# Patient Record
Sex: MALE | Race: WHITE | HISPANIC OR LATINO | Employment: FULL TIME | URBAN - METROPOLITAN AREA
[De-identification: names, ages, dates, MRNs, and addresses within clinical notes are randomized per-mention and may not be internally consistent; named-entity substitution may affect disease eponyms.]

---

## 2017-01-04 ENCOUNTER — GENERIC CONVERSION - ENCOUNTER (OUTPATIENT)
Dept: OTHER | Facility: OTHER | Age: 57
End: 2017-01-04

## 2017-02-14 ENCOUNTER — ALLSCRIPTS OFFICE VISIT (OUTPATIENT)
Dept: OTHER | Facility: OTHER | Age: 57
End: 2017-02-14

## 2017-02-14 ENCOUNTER — HOSPITAL ENCOUNTER (OUTPATIENT)
Dept: RADIOLOGY | Facility: HOSPITAL | Age: 57
Discharge: HOME | End: 2017-02-14
Attending: ORTHOPAEDIC SURGERY
Payer: COMMERCIAL

## 2017-02-14 DIAGNOSIS — M25.512 PAIN IN LEFT SHOULDER: ICD-10-CM

## 2017-02-14 DIAGNOSIS — M75.42 IMPINGEMENT SYNDROME OF LEFT SHOULDER: ICD-10-CM

## 2017-02-14 PROCEDURE — 73030 X-RAY EXAM OF SHOULDER: CPT

## 2017-02-28 ENCOUNTER — GENERIC CONVERSION - ENCOUNTER (OUTPATIENT)
Dept: OTHER | Facility: OTHER | Age: 57
End: 2017-02-28

## 2017-02-28 ENCOUNTER — APPOINTMENT (OUTPATIENT)
Dept: PHYSICAL THERAPY | Facility: CLINIC | Age: 57
End: 2017-02-28
Payer: COMMERCIAL

## 2017-02-28 DIAGNOSIS — M75.42 IMPINGEMENT SYNDROME OF LEFT SHOULDER: ICD-10-CM

## 2017-02-28 PROCEDURE — 97161 PT EVAL LOW COMPLEX 20 MIN: CPT

## 2017-02-28 PROCEDURE — 97110 THERAPEUTIC EXERCISES: CPT

## 2017-03-02 ENCOUNTER — APPOINTMENT (OUTPATIENT)
Dept: PHYSICAL THERAPY | Facility: CLINIC | Age: 57
End: 2017-03-02
Payer: COMMERCIAL

## 2017-03-02 PROCEDURE — 97140 MANUAL THERAPY 1/> REGIONS: CPT

## 2017-03-02 PROCEDURE — 97110 THERAPEUTIC EXERCISES: CPT

## 2017-03-06 ENCOUNTER — GENERIC CONVERSION - ENCOUNTER (OUTPATIENT)
Dept: OTHER | Facility: OTHER | Age: 57
End: 2017-03-06

## 2017-03-07 ENCOUNTER — APPOINTMENT (OUTPATIENT)
Dept: PHYSICAL THERAPY | Facility: CLINIC | Age: 57
End: 2017-03-07
Payer: COMMERCIAL

## 2017-03-07 PROCEDURE — 97140 MANUAL THERAPY 1/> REGIONS: CPT

## 2017-03-07 PROCEDURE — 97110 THERAPEUTIC EXERCISES: CPT

## 2017-03-09 ENCOUNTER — APPOINTMENT (OUTPATIENT)
Dept: PHYSICAL THERAPY | Facility: CLINIC | Age: 57
End: 2017-03-09
Payer: COMMERCIAL

## 2017-03-09 PROCEDURE — 97140 MANUAL THERAPY 1/> REGIONS: CPT

## 2017-03-09 PROCEDURE — 97110 THERAPEUTIC EXERCISES: CPT

## 2017-03-16 ENCOUNTER — APPOINTMENT (OUTPATIENT)
Dept: PHYSICAL THERAPY | Facility: CLINIC | Age: 57
End: 2017-03-16
Payer: COMMERCIAL

## 2017-03-16 PROCEDURE — 97140 MANUAL THERAPY 1/> REGIONS: CPT

## 2017-03-16 PROCEDURE — 97110 THERAPEUTIC EXERCISES: CPT

## 2017-03-20 ENCOUNTER — ALLSCRIPTS OFFICE VISIT (OUTPATIENT)
Dept: OTHER | Facility: OTHER | Age: 57
End: 2017-03-20

## 2017-03-21 ENCOUNTER — APPOINTMENT (OUTPATIENT)
Dept: PHYSICAL THERAPY | Facility: CLINIC | Age: 57
End: 2017-03-21
Payer: COMMERCIAL

## 2017-03-21 PROCEDURE — 97110 THERAPEUTIC EXERCISES: CPT

## 2017-03-21 PROCEDURE — 97140 MANUAL THERAPY 1/> REGIONS: CPT

## 2017-03-23 ENCOUNTER — APPOINTMENT (OUTPATIENT)
Dept: PHYSICAL THERAPY | Facility: CLINIC | Age: 57
End: 2017-03-23
Payer: COMMERCIAL

## 2017-03-28 ENCOUNTER — GENERIC CONVERSION - ENCOUNTER (OUTPATIENT)
Dept: OTHER | Facility: OTHER | Age: 57
End: 2017-03-28

## 2017-03-28 ENCOUNTER — APPOINTMENT (OUTPATIENT)
Dept: PHYSICAL THERAPY | Facility: CLINIC | Age: 57
End: 2017-03-28
Payer: COMMERCIAL

## 2017-03-28 PROCEDURE — 97110 THERAPEUTIC EXERCISES: CPT

## 2017-03-28 PROCEDURE — 97140 MANUAL THERAPY 1/> REGIONS: CPT

## 2017-03-30 ENCOUNTER — APPOINTMENT (OUTPATIENT)
Dept: PHYSICAL THERAPY | Facility: CLINIC | Age: 57
End: 2017-03-30
Payer: COMMERCIAL

## 2017-03-30 PROCEDURE — 97140 MANUAL THERAPY 1/> REGIONS: CPT

## 2017-03-30 PROCEDURE — 97110 THERAPEUTIC EXERCISES: CPT

## 2017-04-04 ENCOUNTER — ALLSCRIPTS OFFICE VISIT (OUTPATIENT)
Dept: OTHER | Facility: OTHER | Age: 57
End: 2017-04-04

## 2017-04-04 ENCOUNTER — TRANSCRIBE ORDERS (OUTPATIENT)
Dept: ADMINISTRATIVE | Facility: HOSPITAL | Age: 57
End: 2017-04-04

## 2017-04-04 DIAGNOSIS — M75.42 IMPINGEMENT SYNDROME OF LEFT SHOULDER: Primary | ICD-10-CM

## 2017-04-06 ENCOUNTER — GENERIC CONVERSION - ENCOUNTER (OUTPATIENT)
Dept: OTHER | Facility: OTHER | Age: 57
End: 2017-04-06

## 2017-04-10 ENCOUNTER — HOSPITAL ENCOUNTER (OUTPATIENT)
Dept: RADIOLOGY | Age: 57
Discharge: HOME | End: 2017-04-10
Payer: COMMERCIAL

## 2017-04-10 DIAGNOSIS — M75.42 IMPINGEMENT SYNDROME OF LEFT SHOULDER: ICD-10-CM

## 2017-04-10 PROCEDURE — 73221 MRI JOINT UPR EXTREM W/O DYE: CPT

## 2017-04-25 ENCOUNTER — ALLSCRIPTS OFFICE VISIT (OUTPATIENT)
Dept: OTHER | Facility: OTHER | Age: 57
End: 2017-04-25

## 2017-04-25 ENCOUNTER — APPOINTMENT (OUTPATIENT)
Dept: LAB | Facility: HOSPITAL | Age: 57
End: 2017-04-25
Attending: ORTHOPAEDIC SURGERY
Payer: COMMERCIAL

## 2017-04-25 DIAGNOSIS — M75.42 IMPINGEMENT SYNDROME OF LEFT SHOULDER: ICD-10-CM

## 2017-04-25 LAB
BASOPHILS # BLD AUTO: 0.05 THOUSANDS/ΜL (ref 0–0.1)
BASOPHILS NFR BLD AUTO: 1 % (ref 0–1)
EOSINOPHIL # BLD AUTO: 0.39 THOUSAND/ΜL (ref 0–0.61)
EOSINOPHIL NFR BLD AUTO: 5 % (ref 0–6)
ERYTHROCYTE [DISTWIDTH] IN BLOOD BY AUTOMATED COUNT: 12.8 % (ref 11.6–15.1)
HCT VFR BLD AUTO: 44.1 % (ref 36.5–49.3)
HGB BLD-MCNC: 14.6 G/DL (ref 12–17)
LYMPHOCYTES # BLD AUTO: 3.25 THOUSANDS/ΜL (ref 0.6–4.47)
LYMPHOCYTES NFR BLD AUTO: 38 % (ref 14–44)
MCH RBC QN AUTO: 29 PG (ref 26.8–34.3)
MCHC RBC AUTO-ENTMCNC: 33.1 G/DL (ref 31.4–37.4)
MCV RBC AUTO: 88 FL (ref 82–98)
MONOCYTES # BLD AUTO: 0.78 THOUSAND/ΜL (ref 0.17–1.22)
MONOCYTES NFR BLD AUTO: 9 % (ref 4–12)
NEUTROPHILS # BLD AUTO: 3.97 THOUSANDS/ΜL (ref 1.85–7.62)
NEUTS SEG NFR BLD AUTO: 47 % (ref 43–75)
NRBC BLD AUTO-RTO: 0 /100 WBCS
PLATELET # BLD AUTO: 293 THOUSANDS/UL (ref 149–390)
PMV BLD AUTO: 9.7 FL (ref 8.9–12.7)
RBC # BLD AUTO: 5.04 MILLION/UL (ref 3.88–5.62)
WBC # BLD AUTO: 8.47 THOUSAND/UL (ref 4.31–10.16)

## 2017-04-25 PROCEDURE — 36415 COLL VENOUS BLD VENIPUNCTURE: CPT

## 2017-04-25 PROCEDURE — 85025 COMPLETE CBC W/AUTO DIFF WBC: CPT

## 2017-05-04 RX ORDER — TADALAFIL 5 MG/1
5 TABLET ORAL DAILY PRN
COMMUNITY

## 2017-05-04 RX ORDER — ACYCLOVIR 400 MG/1
400 TABLET ORAL
COMMUNITY
End: 2018-03-16 | Stop reason: SDUPTHER

## 2017-05-04 RX ORDER — KETOCONAZOLE 200 MG/1
200 TABLET ORAL DAILY
COMMUNITY
End: 2018-12-24

## 2017-05-09 ENCOUNTER — ANESTHESIA (OUTPATIENT)
Dept: PERIOP | Facility: HOSPITAL | Age: 57
End: 2017-05-09
Payer: COMMERCIAL

## 2017-05-09 ENCOUNTER — ANESTHESIA EVENT (OUTPATIENT)
Dept: PERIOP | Facility: HOSPITAL | Age: 57
End: 2017-05-09
Payer: COMMERCIAL

## 2017-05-09 ENCOUNTER — HOSPITAL ENCOUNTER (OUTPATIENT)
Facility: HOSPITAL | Age: 57
Setting detail: OUTPATIENT SURGERY
Discharge: HOME | End: 2017-05-09
Attending: ORTHOPAEDIC SURGERY | Admitting: ORTHOPAEDIC SURGERY
Payer: COMMERCIAL

## 2017-05-09 VITALS
SYSTOLIC BLOOD PRESSURE: 160 MMHG | WEIGHT: 185 LBS | HEIGHT: 66 IN | HEART RATE: 75 BPM | OXYGEN SATURATION: 98 % | BODY MASS INDEX: 29.73 KG/M2 | TEMPERATURE: 99.1 F | DIASTOLIC BLOOD PRESSURE: 99 MMHG | RESPIRATION RATE: 16 BRPM

## 2017-05-09 VITALS — OXYGEN SATURATION: 98 % | SYSTOLIC BLOOD PRESSURE: 148 MMHG | HEART RATE: 70 BPM | DIASTOLIC BLOOD PRESSURE: 80 MMHG

## 2017-05-09 PROBLEM — M75.42 IMPINGEMENT SYNDROME OF LEFT SHOULDER: Status: ACTIVE | Noted: 2017-05-09

## 2017-05-09 PROBLEM — S43.432A SUPERIOR GLENOID LABRUM LESION OF LEFT SHOULDER: Status: ACTIVE | Noted: 2017-05-09

## 2017-05-09 PROCEDURE — C1713 ANCHOR/SCREW BN/BN,TIS/BN: HCPCS | Performed by: ORTHOPAEDIC SURGERY

## 2017-05-09 DEVICE — DEPUY MITEK HEALIX ADVANCE 4.5 BR: Type: IMPLANTABLE DEVICE | Site: SHOULDER | Status: FUNCTIONAL

## 2017-05-09 RX ORDER — ONDANSETRON 2 MG/ML
4 INJECTION INTRAMUSCULAR; INTRAVENOUS ONCE AS NEEDED
Status: COMPLETED | OUTPATIENT
Start: 2017-05-09 | End: 2017-05-09

## 2017-05-09 RX ORDER — FENTANYL CITRATE 50 UG/ML
INJECTION, SOLUTION INTRAMUSCULAR; INTRAVENOUS
Status: COMPLETED
Start: 2017-05-09 | End: 2017-05-09

## 2017-05-09 RX ORDER — ONDANSETRON 2 MG/ML
4 INJECTION INTRAMUSCULAR; INTRAVENOUS EVERY 6 HOURS PRN
Status: DISCONTINUED | OUTPATIENT
Start: 2017-05-09 | End: 2017-05-09 | Stop reason: HOSPADM

## 2017-05-09 RX ORDER — ONDANSETRON 2 MG/ML
INJECTION INTRAMUSCULAR; INTRAVENOUS AS NEEDED
Status: DISCONTINUED | OUTPATIENT
Start: 2017-05-09 | End: 2017-05-09 | Stop reason: SURG

## 2017-05-09 RX ORDER — PROPOFOL 10 MG/ML
INJECTION, EMULSION INTRAVENOUS AS NEEDED
Status: DISCONTINUED | OUTPATIENT
Start: 2017-05-09 | End: 2017-05-09 | Stop reason: SURG

## 2017-05-09 RX ORDER — METOCLOPRAMIDE HYDROCHLORIDE 5 MG/ML
10 INJECTION INTRAMUSCULAR; INTRAVENOUS ONCE AS NEEDED
Status: COMPLETED | OUTPATIENT
Start: 2017-05-09 | End: 2017-05-09

## 2017-05-09 RX ORDER — LIDOCAINE HYDROCHLORIDE 10 MG/ML
INJECTION, SOLUTION INFILTRATION; PERINEURAL AS NEEDED
Status: DISCONTINUED | OUTPATIENT
Start: 2017-05-09 | End: 2017-05-09 | Stop reason: SURG

## 2017-05-09 RX ORDER — SODIUM CHLORIDE, SODIUM LACTATE, POTASSIUM CHLORIDE, CALCIUM CHLORIDE 600; 310; 30; 20 MG/100ML; MG/100ML; MG/100ML; MG/100ML
20 INJECTION, SOLUTION INTRAVENOUS CONTINUOUS
Status: DISCONTINUED | OUTPATIENT
Start: 2017-05-09 | End: 2017-05-09

## 2017-05-09 RX ORDER — ACETAMINOPHEN 325 MG/1
650 TABLET ORAL EVERY 6 HOURS PRN
Status: DISCONTINUED | OUTPATIENT
Start: 2017-05-09 | End: 2017-05-09 | Stop reason: HOSPADM

## 2017-05-09 RX ORDER — OXYCODONE HYDROCHLORIDE 5 MG/1
TABLET ORAL
Qty: 40 TABLET | Refills: 0 | Status: SHIPPED | OUTPATIENT
Start: 2017-05-09 | End: 2018-12-24

## 2017-05-09 RX ORDER — FENTANYL CITRATE/PF 50 MCG/ML
25 SYRINGE (ML) INJECTION
Status: DISCONTINUED | OUTPATIENT
Start: 2017-05-09 | End: 2017-05-09 | Stop reason: HOSPADM

## 2017-05-09 RX ORDER — OXYCODONE HYDROCHLORIDE 5 MG/1
5 TABLET ORAL EVERY 4 HOURS PRN
Status: DISCONTINUED | OUTPATIENT
Start: 2017-05-09 | End: 2017-05-09 | Stop reason: HOSPADM

## 2017-05-09 RX ORDER — MIDAZOLAM HYDROCHLORIDE 1 MG/ML
INJECTION INTRAMUSCULAR; INTRAVENOUS
Status: COMPLETED
Start: 2017-05-09 | End: 2017-05-09

## 2017-05-09 RX ORDER — TRAMADOL HYDROCHLORIDE 50 MG/1
50 TABLET ORAL EVERY 6 HOURS PRN
COMMUNITY
End: 2017-05-09 | Stop reason: HOSPADM

## 2017-05-09 RX ORDER — SODIUM CHLORIDE, SODIUM LACTATE, POTASSIUM CHLORIDE, CALCIUM CHLORIDE 600; 310; 30; 20 MG/100ML; MG/100ML; MG/100ML; MG/100ML
100 INJECTION, SOLUTION INTRAVENOUS CONTINUOUS
Status: DISCONTINUED | OUTPATIENT
Start: 2017-05-09 | End: 2017-05-09 | Stop reason: HOSPADM

## 2017-05-09 RX ADMIN — FENTANYL CITRATE 50 MCG: 50 INJECTION INTRAMUSCULAR; INTRAVENOUS at 09:30

## 2017-05-09 RX ADMIN — FENTANYL CITRATE 50 MCG: 50 INJECTION INTRAMUSCULAR; INTRAVENOUS at 09:44

## 2017-05-09 RX ADMIN — FENTANYL CITRATE 50 MCG: 50 INJECTION INTRAMUSCULAR; INTRAVENOUS at 08:53

## 2017-05-09 RX ADMIN — METOCLOPRAMIDE 10 MG: 5 INJECTION, SOLUTION INTRAMUSCULAR; INTRAVENOUS at 10:47

## 2017-05-09 RX ADMIN — ONDANSETRON 4 MG: 2 INJECTION INTRAMUSCULAR; INTRAVENOUS at 09:38

## 2017-05-09 RX ADMIN — ROPIVACAINE HYDROCHLORIDE: 2 INJECTION, SOLUTION EPIDURAL; INFILTRATION at 10:36

## 2017-05-09 RX ADMIN — SODIUM CHLORIDE, SODIUM LACTATE, POTASSIUM CHLORIDE, AND CALCIUM CHLORIDE 20 ML/HR: .6; .31; .03; .02 INJECTION, SOLUTION INTRAVENOUS at 08:00

## 2017-05-09 RX ADMIN — ONDANSETRON 4 MG: 2 INJECTION INTRAMUSCULAR; INTRAVENOUS at 10:54

## 2017-05-09 RX ADMIN — LIDOCAINE HYDROCHLORIDE 50 MG: 10 INJECTION, SOLUTION INFILTRATION; PERINEURAL at 09:23

## 2017-05-09 RX ADMIN — DEXAMETHASONE SODIUM PHOSPHATE 10 MG: 10 INJECTION INTRAMUSCULAR; INTRAVENOUS at 09:38

## 2017-05-09 RX ADMIN — PROPOFOL 200 MG: 10 INJECTION, EMULSION INTRAVENOUS at 09:23

## 2017-05-09 RX ADMIN — MIDAZOLAM HYDROCHLORIDE 2 MG: 1 INJECTION, SOLUTION INTRAMUSCULAR; INTRAVENOUS at 08:51

## 2017-05-09 RX ADMIN — Medication 2000 MG: at 09:25

## 2017-05-15 ENCOUNTER — ALLSCRIPTS OFFICE VISIT (OUTPATIENT)
Dept: OTHER | Facility: OTHER | Age: 57
End: 2017-05-15

## 2017-05-16 ENCOUNTER — GENERIC CONVERSION - ENCOUNTER (OUTPATIENT)
Dept: OTHER | Facility: OTHER | Age: 57
End: 2017-05-16

## 2017-05-16 ENCOUNTER — APPOINTMENT (OUTPATIENT)
Dept: PHYSICAL THERAPY | Facility: CLINIC | Age: 57
End: 2017-05-16
Payer: COMMERCIAL

## 2017-05-16 DIAGNOSIS — M75.42 IMPINGEMENT SYNDROME OF LEFT SHOULDER: ICD-10-CM

## 2017-05-16 PROCEDURE — 97161 PT EVAL LOW COMPLEX 20 MIN: CPT

## 2017-05-16 PROCEDURE — 97110 THERAPEUTIC EXERCISES: CPT

## 2017-05-16 PROCEDURE — 97140 MANUAL THERAPY 1/> REGIONS: CPT

## 2017-05-18 ENCOUNTER — APPOINTMENT (OUTPATIENT)
Dept: PHYSICAL THERAPY | Facility: CLINIC | Age: 57
End: 2017-05-18
Payer: COMMERCIAL

## 2017-05-18 PROCEDURE — 97140 MANUAL THERAPY 1/> REGIONS: CPT

## 2017-05-18 PROCEDURE — 97110 THERAPEUTIC EXERCISES: CPT

## 2017-05-23 ENCOUNTER — APPOINTMENT (OUTPATIENT)
Dept: PHYSICAL THERAPY | Facility: CLINIC | Age: 57
End: 2017-05-23
Payer: COMMERCIAL

## 2017-05-23 ENCOUNTER — GENERIC CONVERSION - ENCOUNTER (OUTPATIENT)
Dept: OTHER | Facility: OTHER | Age: 57
End: 2017-05-23

## 2017-05-23 PROCEDURE — 97140 MANUAL THERAPY 1/> REGIONS: CPT

## 2017-05-23 PROCEDURE — 97110 THERAPEUTIC EXERCISES: CPT

## 2017-05-25 ENCOUNTER — APPOINTMENT (OUTPATIENT)
Dept: PHYSICAL THERAPY | Facility: CLINIC | Age: 57
End: 2017-05-25
Payer: COMMERCIAL

## 2017-05-25 PROCEDURE — 97110 THERAPEUTIC EXERCISES: CPT

## 2017-05-25 PROCEDURE — 97140 MANUAL THERAPY 1/> REGIONS: CPT

## 2017-05-25 PROCEDURE — 97032 APPL MODALITY 1+ESTIM EA 15: CPT

## 2017-05-30 ENCOUNTER — APPOINTMENT (OUTPATIENT)
Dept: PHYSICAL THERAPY | Facility: CLINIC | Age: 57
End: 2017-05-30
Payer: COMMERCIAL

## 2017-05-30 PROCEDURE — 97032 APPL MODALITY 1+ESTIM EA 15: CPT

## 2017-05-30 PROCEDURE — 97110 THERAPEUTIC EXERCISES: CPT

## 2017-05-30 PROCEDURE — 97140 MANUAL THERAPY 1/> REGIONS: CPT

## 2017-06-01 ENCOUNTER — APPOINTMENT (OUTPATIENT)
Dept: PHYSICAL THERAPY | Facility: CLINIC | Age: 57
End: 2017-06-01
Payer: COMMERCIAL

## 2017-06-01 PROCEDURE — 97110 THERAPEUTIC EXERCISES: CPT

## 2017-06-01 PROCEDURE — 97140 MANUAL THERAPY 1/> REGIONS: CPT

## 2017-06-06 ENCOUNTER — APPOINTMENT (OUTPATIENT)
Dept: PHYSICAL THERAPY | Facility: CLINIC | Age: 57
End: 2017-06-06
Payer: COMMERCIAL

## 2017-06-06 PROCEDURE — 97110 THERAPEUTIC EXERCISES: CPT

## 2017-06-06 PROCEDURE — 97140 MANUAL THERAPY 1/> REGIONS: CPT

## 2017-06-08 ENCOUNTER — GENERIC CONVERSION - ENCOUNTER (OUTPATIENT)
Dept: OTHER | Facility: OTHER | Age: 57
End: 2017-06-08

## 2017-06-08 ENCOUNTER — APPOINTMENT (OUTPATIENT)
Dept: PHYSICAL THERAPY | Facility: CLINIC | Age: 57
End: 2017-06-08
Payer: COMMERCIAL

## 2017-06-08 PROCEDURE — 97140 MANUAL THERAPY 1/> REGIONS: CPT

## 2017-06-08 PROCEDURE — 97110 THERAPEUTIC EXERCISES: CPT

## 2017-06-12 ENCOUNTER — APPOINTMENT (OUTPATIENT)
Dept: PHYSICAL THERAPY | Facility: CLINIC | Age: 57
End: 2017-06-12
Payer: COMMERCIAL

## 2017-06-12 PROCEDURE — 97110 THERAPEUTIC EXERCISES: CPT

## 2017-06-12 PROCEDURE — 97140 MANUAL THERAPY 1/> REGIONS: CPT

## 2017-06-15 ENCOUNTER — APPOINTMENT (OUTPATIENT)
Dept: PHYSICAL THERAPY | Facility: CLINIC | Age: 57
End: 2017-06-15
Payer: COMMERCIAL

## 2017-06-15 PROCEDURE — 97140 MANUAL THERAPY 1/> REGIONS: CPT

## 2017-06-15 PROCEDURE — 97110 THERAPEUTIC EXERCISES: CPT

## 2017-06-16 ENCOUNTER — GENERIC CONVERSION - ENCOUNTER (OUTPATIENT)
Dept: OTHER | Facility: OTHER | Age: 57
End: 2017-06-16

## 2017-06-19 ENCOUNTER — APPOINTMENT (OUTPATIENT)
Dept: PHYSICAL THERAPY | Facility: CLINIC | Age: 57
End: 2017-06-19
Payer: COMMERCIAL

## 2017-06-19 PROCEDURE — 97110 THERAPEUTIC EXERCISES: CPT

## 2017-06-19 PROCEDURE — 97140 MANUAL THERAPY 1/> REGIONS: CPT

## 2017-06-22 ENCOUNTER — APPOINTMENT (OUTPATIENT)
Dept: PHYSICAL THERAPY | Facility: CLINIC | Age: 57
End: 2017-06-22
Payer: COMMERCIAL

## 2017-06-22 PROCEDURE — 97110 THERAPEUTIC EXERCISES: CPT

## 2017-06-22 PROCEDURE — 97140 MANUAL THERAPY 1/> REGIONS: CPT

## 2017-06-26 ENCOUNTER — APPOINTMENT (OUTPATIENT)
Dept: PHYSICAL THERAPY | Facility: CLINIC | Age: 57
End: 2017-06-26
Payer: COMMERCIAL

## 2017-06-26 PROCEDURE — 97110 THERAPEUTIC EXERCISES: CPT

## 2017-06-26 PROCEDURE — 97140 MANUAL THERAPY 1/> REGIONS: CPT

## 2017-06-29 ENCOUNTER — APPOINTMENT (OUTPATIENT)
Dept: PHYSICAL THERAPY | Facility: CLINIC | Age: 57
End: 2017-06-29
Payer: COMMERCIAL

## 2017-06-29 PROCEDURE — 97140 MANUAL THERAPY 1/> REGIONS: CPT

## 2017-06-29 PROCEDURE — 97110 THERAPEUTIC EXERCISES: CPT

## 2017-07-05 ENCOUNTER — APPOINTMENT (OUTPATIENT)
Dept: PHYSICAL THERAPY | Facility: CLINIC | Age: 57
End: 2017-07-05
Payer: COMMERCIAL

## 2017-07-05 PROCEDURE — 97140 MANUAL THERAPY 1/> REGIONS: CPT

## 2017-07-05 PROCEDURE — 97110 THERAPEUTIC EXERCISES: CPT

## 2017-07-10 ENCOUNTER — GENERIC CONVERSION - ENCOUNTER (OUTPATIENT)
Dept: OTHER | Facility: OTHER | Age: 57
End: 2017-07-10

## 2017-07-10 ENCOUNTER — APPOINTMENT (OUTPATIENT)
Dept: PHYSICAL THERAPY | Facility: CLINIC | Age: 57
End: 2017-07-10
Payer: COMMERCIAL

## 2017-07-10 PROCEDURE — 97110 THERAPEUTIC EXERCISES: CPT

## 2017-07-10 PROCEDURE — 97140 MANUAL THERAPY 1/> REGIONS: CPT

## 2017-07-13 ENCOUNTER — APPOINTMENT (OUTPATIENT)
Dept: PHYSICAL THERAPY | Facility: CLINIC | Age: 57
End: 2017-07-13
Payer: COMMERCIAL

## 2017-07-13 PROCEDURE — 97110 THERAPEUTIC EXERCISES: CPT

## 2017-07-13 PROCEDURE — 97140 MANUAL THERAPY 1/> REGIONS: CPT

## 2017-07-17 ENCOUNTER — APPOINTMENT (OUTPATIENT)
Dept: PHYSICAL THERAPY | Facility: CLINIC | Age: 57
End: 2017-07-17
Payer: COMMERCIAL

## 2017-07-17 ENCOUNTER — ALLSCRIPTS OFFICE VISIT (OUTPATIENT)
Dept: OTHER | Facility: OTHER | Age: 57
End: 2017-07-17

## 2017-07-17 PROCEDURE — 97140 MANUAL THERAPY 1/> REGIONS: CPT

## 2017-07-17 PROCEDURE — 97110 THERAPEUTIC EXERCISES: CPT

## 2017-07-20 ENCOUNTER — APPOINTMENT (OUTPATIENT)
Dept: PHYSICAL THERAPY | Facility: CLINIC | Age: 57
End: 2017-07-20
Payer: COMMERCIAL

## 2017-07-20 PROCEDURE — 97110 THERAPEUTIC EXERCISES: CPT

## 2017-07-20 PROCEDURE — 97140 MANUAL THERAPY 1/> REGIONS: CPT

## 2017-07-24 ENCOUNTER — APPOINTMENT (OUTPATIENT)
Dept: PHYSICAL THERAPY | Facility: CLINIC | Age: 57
End: 2017-07-24
Payer: COMMERCIAL

## 2017-07-24 PROCEDURE — 97110 THERAPEUTIC EXERCISES: CPT

## 2017-07-24 PROCEDURE — 97140 MANUAL THERAPY 1/> REGIONS: CPT

## 2017-07-27 ENCOUNTER — APPOINTMENT (OUTPATIENT)
Dept: PHYSICAL THERAPY | Facility: CLINIC | Age: 57
End: 2017-07-27
Payer: COMMERCIAL

## 2017-07-31 ENCOUNTER — APPOINTMENT (OUTPATIENT)
Dept: PHYSICAL THERAPY | Facility: CLINIC | Age: 57
End: 2017-07-31
Payer: COMMERCIAL

## 2017-07-31 PROCEDURE — 97140 MANUAL THERAPY 1/> REGIONS: CPT

## 2017-07-31 PROCEDURE — 97110 THERAPEUTIC EXERCISES: CPT

## 2017-08-03 ENCOUNTER — GENERIC CONVERSION - ENCOUNTER (OUTPATIENT)
Dept: OTHER | Facility: OTHER | Age: 57
End: 2017-08-03

## 2017-08-03 ENCOUNTER — APPOINTMENT (OUTPATIENT)
Dept: PHYSICAL THERAPY | Facility: CLINIC | Age: 57
End: 2017-08-03
Payer: COMMERCIAL

## 2017-08-03 PROCEDURE — 97110 THERAPEUTIC EXERCISES: CPT

## 2017-08-03 PROCEDURE — 97140 MANUAL THERAPY 1/> REGIONS: CPT

## 2017-08-07 ENCOUNTER — APPOINTMENT (OUTPATIENT)
Dept: PHYSICAL THERAPY | Facility: CLINIC | Age: 57
End: 2017-08-07
Payer: COMMERCIAL

## 2017-08-07 PROCEDURE — 97140 MANUAL THERAPY 1/> REGIONS: CPT

## 2017-08-07 PROCEDURE — 97110 THERAPEUTIC EXERCISES: CPT

## 2017-08-10 ENCOUNTER — APPOINTMENT (OUTPATIENT)
Dept: PHYSICAL THERAPY | Facility: CLINIC | Age: 57
End: 2017-08-10
Payer: COMMERCIAL

## 2017-08-10 PROCEDURE — 97140 MANUAL THERAPY 1/> REGIONS: CPT

## 2017-08-10 PROCEDURE — 97110 THERAPEUTIC EXERCISES: CPT

## 2017-08-14 ENCOUNTER — GENERIC CONVERSION - ENCOUNTER (OUTPATIENT)
Dept: OTHER | Facility: OTHER | Age: 57
End: 2017-08-14

## 2017-08-14 ENCOUNTER — APPOINTMENT (OUTPATIENT)
Dept: PHYSICAL THERAPY | Facility: CLINIC | Age: 57
End: 2017-08-14
Payer: COMMERCIAL

## 2017-08-14 PROCEDURE — 97140 MANUAL THERAPY 1/> REGIONS: CPT

## 2017-08-14 PROCEDURE — 97110 THERAPEUTIC EXERCISES: CPT

## 2017-08-17 ENCOUNTER — APPOINTMENT (OUTPATIENT)
Dept: PHYSICAL THERAPY | Facility: CLINIC | Age: 57
End: 2017-08-17
Payer: COMMERCIAL

## 2017-08-17 PROCEDURE — 97140 MANUAL THERAPY 1/> REGIONS: CPT

## 2017-08-17 PROCEDURE — 97110 THERAPEUTIC EXERCISES: CPT

## 2017-08-21 ENCOUNTER — APPOINTMENT (OUTPATIENT)
Dept: PHYSICAL THERAPY | Facility: CLINIC | Age: 57
End: 2017-08-21
Payer: COMMERCIAL

## 2017-08-21 ENCOUNTER — ALLSCRIPTS OFFICE VISIT (OUTPATIENT)
Dept: OTHER | Facility: OTHER | Age: 57
End: 2017-08-21

## 2017-08-21 DIAGNOSIS — E55.9 VITAMIN D DEFICIENCY: ICD-10-CM

## 2017-08-21 DIAGNOSIS — E78.00 PURE HYPERCHOLESTEROLEMIA: ICD-10-CM

## 2017-08-21 DIAGNOSIS — Z12.5 ENCOUNTER FOR SCREENING FOR MALIGNANT NEOPLASM OF PROSTATE: ICD-10-CM

## 2017-08-21 DIAGNOSIS — E78.1 PURE HYPERGLYCERIDEMIA: ICD-10-CM

## 2017-08-21 PROCEDURE — 97140 MANUAL THERAPY 1/> REGIONS: CPT

## 2017-08-21 PROCEDURE — 97110 THERAPEUTIC EXERCISES: CPT

## 2017-08-24 ENCOUNTER — APPOINTMENT (OUTPATIENT)
Dept: PHYSICAL THERAPY | Facility: CLINIC | Age: 57
End: 2017-08-24
Payer: COMMERCIAL

## 2017-08-24 ENCOUNTER — GENERIC CONVERSION - ENCOUNTER (OUTPATIENT)
Dept: OTHER | Facility: OTHER | Age: 57
End: 2017-08-24

## 2017-08-24 PROCEDURE — 97110 THERAPEUTIC EXERCISES: CPT

## 2017-08-24 PROCEDURE — 97140 MANUAL THERAPY 1/> REGIONS: CPT

## 2017-08-25 ENCOUNTER — TRANSCRIBE ORDERS (OUTPATIENT)
Dept: LAB | Facility: HOSPITAL | Age: 57
End: 2017-08-25

## 2017-08-25 ENCOUNTER — APPOINTMENT (OUTPATIENT)
Dept: LAB | Facility: HOSPITAL | Age: 57
End: 2017-08-25
Payer: COMMERCIAL

## 2017-08-25 DIAGNOSIS — E78.00 PURE HYPERCHOLESTEROLEMIA: ICD-10-CM

## 2017-08-25 DIAGNOSIS — E55.9 VITAMIN D DEFICIENCY: ICD-10-CM

## 2017-08-25 DIAGNOSIS — Z12.5 ENCOUNTER FOR SCREENING FOR MALIGNANT NEOPLASM OF PROSTATE: ICD-10-CM

## 2017-08-25 DIAGNOSIS — E78.1 PURE HYPERGLYCERIDEMIA: ICD-10-CM

## 2017-08-25 LAB
25(OH)D3 SERPL-MCNC: 15.5 NG/ML (ref 30–100)
ALBUMIN SERPL BCP-MCNC: 3.8 G/DL (ref 3.5–5)
ALP SERPL-CCNC: 83 U/L (ref 46–116)
ALT SERPL W P-5'-P-CCNC: 35 U/L (ref 12–78)
ANION GAP SERPL CALCULATED.3IONS-SCNC: 9 MMOL/L (ref 4–13)
AST SERPL W P-5'-P-CCNC: 22 U/L (ref 5–45)
BILIRUB DIRECT SERPL-MCNC: 0.1 MG/DL (ref 0–0.2)
BILIRUB SERPL-MCNC: 0.5 MG/DL (ref 0.2–1)
BUN SERPL-MCNC: 15 MG/DL (ref 5–25)
CALCIUM SERPL-MCNC: 9.3 MG/DL (ref 8.3–10.1)
CHLORIDE SERPL-SCNC: 105 MMOL/L (ref 100–108)
CHOLEST SERPL-MCNC: 261 MG/DL (ref 50–200)
CO2 SERPL-SCNC: 27 MMOL/L (ref 21–32)
CREAT SERPL-MCNC: 1.06 MG/DL (ref 0.6–1.3)
ERYTHROCYTE [DISTWIDTH] IN BLOOD BY AUTOMATED COUNT: 12.7 % (ref 11.6–15.1)
GFR SERPL CREATININE-BSD FRML MDRD: 78 ML/MIN/1.73SQ M
GLUCOSE P FAST SERPL-MCNC: 89 MG/DL (ref 65–99)
HCT VFR BLD AUTO: 42.6 % (ref 36.5–49.3)
HDLC SERPL-MCNC: 46 MG/DL (ref 40–60)
HGB BLD-MCNC: 14.5 G/DL (ref 12–17)
LDLC SERPL CALC-MCNC: 152 MG/DL (ref 0–100)
MCH RBC QN AUTO: 29.7 PG (ref 26.8–34.3)
MCHC RBC AUTO-ENTMCNC: 34 G/DL (ref 31.4–37.4)
MCV RBC AUTO: 87 FL (ref 82–98)
PLATELET # BLD AUTO: 310 THOUSANDS/UL (ref 149–390)
PMV BLD AUTO: 10.2 FL (ref 8.9–12.7)
POTASSIUM SERPL-SCNC: 3.5 MMOL/L (ref 3.5–5.3)
PROT SERPL-MCNC: 7.5 G/DL (ref 6.4–8.2)
PSA SERPL-MCNC: 1.5 NG/ML (ref 0–4)
RBC # BLD AUTO: 4.89 MILLION/UL (ref 3.88–5.62)
SODIUM SERPL-SCNC: 141 MMOL/L (ref 136–145)
TRIGL SERPL-MCNC: 317 MG/DL
WBC # BLD AUTO: 8.28 THOUSAND/UL (ref 4.31–10.16)

## 2017-08-25 PROCEDURE — 80061 LIPID PANEL: CPT

## 2017-08-25 PROCEDURE — 82306 VITAMIN D 25 HYDROXY: CPT

## 2017-08-25 PROCEDURE — 80048 BASIC METABOLIC PNL TOTAL CA: CPT

## 2017-08-25 PROCEDURE — 80076 HEPATIC FUNCTION PANEL: CPT

## 2017-08-25 PROCEDURE — 85027 COMPLETE CBC AUTOMATED: CPT

## 2017-08-25 PROCEDURE — G0103 PSA SCREENING: HCPCS

## 2017-08-25 PROCEDURE — 36415 COLL VENOUS BLD VENIPUNCTURE: CPT

## 2017-08-28 ENCOUNTER — GENERIC CONVERSION - ENCOUNTER (OUTPATIENT)
Dept: OTHER | Facility: OTHER | Age: 57
End: 2017-08-28

## 2017-08-28 ENCOUNTER — APPOINTMENT (OUTPATIENT)
Dept: PHYSICAL THERAPY | Facility: CLINIC | Age: 57
End: 2017-08-28
Payer: COMMERCIAL

## 2017-08-31 ENCOUNTER — APPOINTMENT (OUTPATIENT)
Dept: PHYSICAL THERAPY | Facility: CLINIC | Age: 57
End: 2017-08-31
Payer: COMMERCIAL

## 2017-08-31 PROCEDURE — 97110 THERAPEUTIC EXERCISES: CPT

## 2017-08-31 PROCEDURE — 97140 MANUAL THERAPY 1/> REGIONS: CPT

## 2017-09-04 ENCOUNTER — GENERIC CONVERSION - ENCOUNTER (OUTPATIENT)
Dept: OTHER | Facility: OTHER | Age: 57
End: 2017-09-04

## 2017-09-07 ENCOUNTER — APPOINTMENT (OUTPATIENT)
Dept: PHYSICAL THERAPY | Facility: CLINIC | Age: 57
End: 2017-09-07
Payer: COMMERCIAL

## 2017-09-07 PROCEDURE — 97110 THERAPEUTIC EXERCISES: CPT

## 2017-09-07 PROCEDURE — 97140 MANUAL THERAPY 1/> REGIONS: CPT

## 2017-09-11 ENCOUNTER — APPOINTMENT (OUTPATIENT)
Dept: PHYSICAL THERAPY | Facility: CLINIC | Age: 57
End: 2017-09-11
Payer: COMMERCIAL

## 2017-09-11 PROCEDURE — 97140 MANUAL THERAPY 1/> REGIONS: CPT

## 2017-09-11 PROCEDURE — 97110 THERAPEUTIC EXERCISES: CPT

## 2017-09-14 ENCOUNTER — APPOINTMENT (OUTPATIENT)
Dept: PHYSICAL THERAPY | Facility: CLINIC | Age: 57
End: 2017-09-14
Payer: COMMERCIAL

## 2017-09-14 PROCEDURE — 97140 MANUAL THERAPY 1/> REGIONS: CPT

## 2017-09-14 PROCEDURE — 97110 THERAPEUTIC EXERCISES: CPT

## 2017-09-18 ENCOUNTER — APPOINTMENT (OUTPATIENT)
Dept: PHYSICAL THERAPY | Facility: CLINIC | Age: 57
End: 2017-09-18
Payer: COMMERCIAL

## 2017-09-18 ENCOUNTER — ALLSCRIPTS OFFICE VISIT (OUTPATIENT)
Dept: OTHER | Facility: OTHER | Age: 57
End: 2017-09-18

## 2017-09-18 PROCEDURE — 97140 MANUAL THERAPY 1/> REGIONS: CPT

## 2017-09-18 PROCEDURE — 97110 THERAPEUTIC EXERCISES: CPT

## 2017-09-21 ENCOUNTER — APPOINTMENT (OUTPATIENT)
Dept: PHYSICAL THERAPY | Facility: CLINIC | Age: 57
End: 2017-09-21
Payer: COMMERCIAL

## 2017-09-21 PROCEDURE — 97140 MANUAL THERAPY 1/> REGIONS: CPT

## 2017-09-21 PROCEDURE — 97110 THERAPEUTIC EXERCISES: CPT

## 2017-09-25 ENCOUNTER — APPOINTMENT (OUTPATIENT)
Dept: PHYSICAL THERAPY | Facility: CLINIC | Age: 57
End: 2017-09-25
Payer: COMMERCIAL

## 2017-09-28 ENCOUNTER — APPOINTMENT (OUTPATIENT)
Dept: PHYSICAL THERAPY | Facility: CLINIC | Age: 57
End: 2017-09-28
Payer: COMMERCIAL

## 2017-11-01 ENCOUNTER — ALLSCRIPTS OFFICE VISIT (OUTPATIENT)
Dept: OTHER | Facility: OTHER | Age: 57
End: 2017-11-01

## 2017-11-02 NOTE — PROGRESS NOTES
Assessment  1  Acute sinusitis (461 9) (J01 90)   2  Cough (786 2) (R05)    Plan  Acute sinusitis    · Azithromycin 250 MG Oral Tablet; TAKE 2 TABLETS ON DAY 1 THEN TAKE 1  TABLET A DAY FOR 4 DAYS   · PredniSONE 10 MG Oral Tablet; TAKE 3 PILLS DAY 1, 2 & 3  TAKE 2 PILLS DAY 4, 5 & 6  TAKE 1 PILL DAY 7, 8, 9 & 10  Cough    · Hydrocod Polst-CPM Polst ER 10-8 MG/5ML Oral Suspension Extended Release;  TAKE 1/4 TO 1 TEASPOON EVERY 12 HOURS PRN DISP   · Follow Up if Not Better Evaluation and Treatment  Follow-up  Status: Complete  Done:  [id number]    Discussion/Summary    Cough med at HS only  Educational resources provided: None  Possible side effects of new medications were reviewed with the patient/guardian today  The treatment plan was reviewed with the patient/guardian  The patient/guardian understands and agrees with the treatment plan     Self Referrals: No      Chief Complaint  pt c/o productive cough, body aches, runny nose, sore throat and post nasal drip      History of Present Illness  HPI: Head cold, cough - worse HS, body aches, yellow production, past 6 days  Occasional wheeze  Not sleeping because of coughing  Review of Systems    Constitutional: no fever or chills, feels well, no tiredness, no recent weight loss or weight gain  ENT: as noted in HPI  Cardiovascular: no complaints of slow or fast heart rate, no chest pain, no palpitations, no leg claudication or lower extremity edema  Respiratory: cough-- and-- wheezing, but-- as noted in HPI,-- no shortness of breath-- and-- no shortness of breath during exertion  Gastrointestinal: no complaints of abdominal pain, no constipation, no nausea or vomiting, no diarrhea or bloody stools  Genitourinary: no complaints of dysuria or incontinence, no hesitancy, no nocturia, no genital lesion, no inadequacy of penile erection  Musculoskeletal: no complaints of arthralgia, no myalgia, no joint swelling or stiffness, no limb pain or swelling  Integumentary: no complaints of skin rash or lesion, no itching or dry skin, no skin wounds  Neurological: no complaints of headache, no confusion, no numbness or tingling, no dizziness or fainting  Active Problems  1  Aftercare following surgery (V58 89) (Z48 89)   2  Colon cancer screening (V76 51) (Z12 11)   3  Decreased libido (799 81) (R68 82)   4  Depression screening (V79 0) (Z13 89)   5  Encounter for prostate cancer screening (V76 44) (Z12 5)   6  History of cold sores (V12 09) (Z86 19)   7  Hypercholesteremia (272 0) (E78 00)   8  Hypertriglyceridemia (272 1) (E78 1)   9  Impingement syndrome of left shoulder (726 2) (M75 42)   10  Recurrent cold sores (054 9) (B00 1)   11  Screening for colorectal cancer (V76 51) (Z12 11,Z12 12)   12  Screening PSA (prostate specific antigen) (V76 44) (Z12 5)   13  Shoulder pain, left (719 41) (M25 512)   14  Tinea versicolor (111 0) (B36 0)   15  Vitamin D deficiency (268 9) (E55 9)   16  Wears seat belt (V49 89) (Z78 9)    Past Medical History  1  History of Acute maxillary sinusitis (461 0) (J01 00)   2  History of Acute recurrent sinusitis, unspecified location (461 9) (J01 91)   3  History of epistaxis (V12 69) (Z87 898)    Family History  Mother    1  Denied: Family history of mental disorder   2  Denied: Family history of substance abuse  Father    3  Denied: Family history of mental disorder   4  Denied: Family history of substance abuse    Social History   · Daily caffeine consumption, 2-3 servings a day   · Never a smoker   · No alcohol use    Surgical History  1  Denied: History of Recent Surgery   2  History of Sinus Surgery    Current Meds   1  Advil 200 MG Oral Capsule; TAKE 1 CAPSULE EVERY 4 TO 6 HOURS; Therapy: [id number] to Recorded   2  Cialis 5 MG Oral Tablet; TAKE AS DIRECTED; Therapy: [id number] to (Last Rx:[id number]) Ordered    The medication list was reviewed and updated today  Allergies  1   No Known Drug Allergies    Vitals Recorded: 57DUJ3720 02:03PM   Temperature 98 5 F, Oral   Heart Rate 82   Systolic 902, LUE   Diastolic 72, LUE   Height 5 ft 6 in   Weight 186 lb    BMI Calculated 30 02   BSA Calculated 1 94   O2 Saturation 99     Physical Exam    Constitutional   General appearance: No acute distress, well appearing and well nourished  Eyes   Conjunctiva and lids: No swelling, erythema, or discharge  Pupils and irises: Equal, round and reactive to light  Ears, Nose, Mouth, and Throat   External inspection of ears and nose: Normal     Otoscopic examination: Tympanic membrance translucent with normal light reflex  Canals patent without erythema  Nasal mucosa, septum, and turbinates: Abnormal  -- congested, yellow DC  Oropharynx: Normal with no erythema, edema, exudate or lesions  Pulmonary   Respiratory effort: No increased work of breathing or signs of respiratory distress  Auscultation of lungs: Clear to auscultation, equal breath sounds bilaterally, no wheezes, no rales, no rhonci  Cardiovascular   Auscultation of heart: Normal rate and rhythm, normal S1 and S2, without murmurs  Examination of extremities for edema and/or varicosities: Normal     Lymphatic   Palpation of lymph nodes in neck: No lymphadenopathy  Musculoskeletal   Gait and station: Normal     Digits and nails: Normal without clubbing or cyanosis  Neurologic   Sensation: No sensory loss      Psychiatric   Orientation to person, place and time: Normal     Mood and affect: Normal          Signatures   Electronically signed by : Kaushik Feliz DO; Nov 1 2017  2:24PM EST                       (Author)

## 2017-11-29 ENCOUNTER — GENERIC CONVERSION - ENCOUNTER (OUTPATIENT)
Dept: OTHER | Facility: OTHER | Age: 57
End: 2017-11-29

## 2018-01-12 VITALS
HEART RATE: 82 BPM | SYSTOLIC BLOOD PRESSURE: 124 MMHG | BODY MASS INDEX: 29.89 KG/M2 | DIASTOLIC BLOOD PRESSURE: 72 MMHG | OXYGEN SATURATION: 99 % | HEIGHT: 66 IN | TEMPERATURE: 98.5 F | WEIGHT: 186 LBS

## 2018-01-12 VITALS
HEART RATE: 76 BPM | DIASTOLIC BLOOD PRESSURE: 86 MMHG | SYSTOLIC BLOOD PRESSURE: 120 MMHG | BODY MASS INDEX: 28.45 KG/M2 | HEIGHT: 66 IN | WEIGHT: 177 LBS

## 2018-01-13 VITALS
HEIGHT: 66 IN | SYSTOLIC BLOOD PRESSURE: 100 MMHG | OXYGEN SATURATION: 98 % | DIASTOLIC BLOOD PRESSURE: 62 MMHG | WEIGHT: 180 LBS | BODY MASS INDEX: 28.93 KG/M2 | RESPIRATION RATE: 16 BRPM | TEMPERATURE: 98.1 F | HEART RATE: 85 BPM

## 2018-01-13 VITALS
BODY MASS INDEX: 30.53 KG/M2 | DIASTOLIC BLOOD PRESSURE: 72 MMHG | SYSTOLIC BLOOD PRESSURE: 104 MMHG | WEIGHT: 189.13 LBS | HEART RATE: 86 BPM

## 2018-01-13 VITALS
BODY MASS INDEX: 29.58 KG/M2 | DIASTOLIC BLOOD PRESSURE: 89 MMHG | SYSTOLIC BLOOD PRESSURE: 130 MMHG | WEIGHT: 183.25 LBS | HEART RATE: 67 BPM

## 2018-01-13 VITALS
WEIGHT: 186.13 LBS | BODY MASS INDEX: 30.04 KG/M2 | HEART RATE: 81 BPM | DIASTOLIC BLOOD PRESSURE: 85 MMHG | SYSTOLIC BLOOD PRESSURE: 121 MMHG

## 2018-01-13 VITALS
HEART RATE: 72 BPM | BODY MASS INDEX: 29.74 KG/M2 | DIASTOLIC BLOOD PRESSURE: 79 MMHG | SYSTOLIC BLOOD PRESSURE: 125 MMHG | WEIGHT: 184.25 LBS

## 2018-01-14 VITALS
HEIGHT: 66 IN | HEART RATE: 78 BPM | BODY MASS INDEX: 30.37 KG/M2 | WEIGHT: 189 LBS | OXYGEN SATURATION: 97 % | TEMPERATURE: 97.7 F | DIASTOLIC BLOOD PRESSURE: 88 MMHG | SYSTOLIC BLOOD PRESSURE: 116 MMHG

## 2018-01-18 NOTE — MISCELLANEOUS
Message  Return to work or school:   Baldomero Bethea is under my professional care  He was seen in my office on 9/18/17   He is able to return to work on  9/25/17    He is able to perform activities of daily living without limitations  Weight Bearing Status: Weight-Bearing As Tolerated  Doron Bingham PA-C        Signatures   Electronically signed by : Vandana Abbott, [facility]; Sep 18 2017 12:00PM EST                       (Author)    Electronically signed by : BIBIANA Porter ; Sep 18 2017 12:16PM EST                       (Author)

## 2018-01-22 VITALS
WEIGHT: 187.13 LBS | HEART RATE: 73 BPM | DIASTOLIC BLOOD PRESSURE: 82 MMHG | HEIGHT: 66 IN | BODY MASS INDEX: 30.07 KG/M2 | SYSTOLIC BLOOD PRESSURE: 126 MMHG

## 2018-01-22 VITALS
DIASTOLIC BLOOD PRESSURE: 84 MMHG | TEMPERATURE: 97.7 F | SYSTOLIC BLOOD PRESSURE: 118 MMHG | BODY MASS INDEX: 30.37 KG/M2 | HEART RATE: 84 BPM | OXYGEN SATURATION: 98 % | WEIGHT: 189 LBS | HEIGHT: 66 IN

## 2018-03-16 DIAGNOSIS — B00.1 RECURRENT COLD SORES: Primary | ICD-10-CM

## 2018-03-16 RX ORDER — ACYCLOVIR 400 MG/1
400 TABLET ORAL 2 TIMES DAILY
Qty: 10 TABLET | Refills: 2 | Status: SHIPPED | OUTPATIENT
Start: 2018-03-16 | End: 2018-12-24

## 2018-06-26 ENCOUNTER — OFFICE VISIT (OUTPATIENT)
Dept: FAMILY MEDICINE CLINIC | Facility: CLINIC | Age: 58
End: 2018-06-26
Payer: COMMERCIAL

## 2018-06-26 ENCOUNTER — APPOINTMENT (OUTPATIENT)
Dept: LAB | Facility: HOSPITAL | Age: 58
End: 2018-06-26
Payer: COMMERCIAL

## 2018-06-26 ENCOUNTER — TELEPHONE (OUTPATIENT)
Dept: UROLOGY | Facility: CLINIC | Age: 58
End: 2018-06-26

## 2018-06-26 VITALS
BODY MASS INDEX: 30.6 KG/M2 | HEART RATE: 81 BPM | DIASTOLIC BLOOD PRESSURE: 62 MMHG | WEIGHT: 190.4 LBS | TEMPERATURE: 98.8 F | SYSTOLIC BLOOD PRESSURE: 114 MMHG | HEIGHT: 66 IN | OXYGEN SATURATION: 98 %

## 2018-06-26 DIAGNOSIS — N41.0 ACUTE PROSTATITIS: ICD-10-CM

## 2018-06-26 DIAGNOSIS — R35.1 NOCTURIA: ICD-10-CM

## 2018-06-26 DIAGNOSIS — N41.0 ACUTE PROSTATITIS: Primary | ICD-10-CM

## 2018-06-26 DIAGNOSIS — R35.0 URINE FREQUENCY: ICD-10-CM

## 2018-06-26 DIAGNOSIS — K40.90 LEFT INGUINAL HERNIA: ICD-10-CM

## 2018-06-26 LAB
ALBUMIN SERPL BCP-MCNC: 3.9 G/DL (ref 3.5–5)
ALP SERPL-CCNC: 83 U/L (ref 46–116)
ALT SERPL W P-5'-P-CCNC: 75 U/L (ref 12–78)
ANION GAP SERPL CALCULATED.3IONS-SCNC: 9 MMOL/L (ref 4–13)
AST SERPL W P-5'-P-CCNC: 33 U/L (ref 5–45)
BACTERIA UR QL AUTO: ABNORMAL /HPF
BILIRUB DIRECT SERPL-MCNC: 0.3 MG/DL (ref 0–0.2)
BILIRUB SERPL-MCNC: 1.21 MG/DL (ref 0.2–1)
BILIRUB UR QL STRIP: ABNORMAL
BUN SERPL-MCNC: 15 MG/DL (ref 5–25)
CALCIUM SERPL-MCNC: 9.3 MG/DL (ref 8.3–10.1)
CHLORIDE SERPL-SCNC: 104 MMOL/L (ref 100–108)
CLARITY UR: CLEAR
CO2 SERPL-SCNC: 23 MMOL/L (ref 21–32)
COLOR UR: ABNORMAL
CREAT SERPL-MCNC: 1.14 MG/DL (ref 0.6–1.3)
ERYTHROCYTE [DISTWIDTH] IN BLOOD BY AUTOMATED COUNT: 12.9 % (ref 11.6–15.1)
GFR SERPL CREATININE-BSD FRML MDRD: 71 ML/MIN/1.73SQ M
GLUCOSE P FAST SERPL-MCNC: 82 MG/DL (ref 65–99)
GLUCOSE UR STRIP-MCNC: NEGATIVE MG/DL
HCT VFR BLD AUTO: 43 % (ref 36.5–49.3)
HGB BLD-MCNC: 13.7 G/DL (ref 12–17)
HGB UR QL STRIP.AUTO: NEGATIVE
HYALINE CASTS #/AREA URNS LPF: ABNORMAL /LPF
KETONES UR STRIP-MCNC: ABNORMAL MG/DL
LEUKOCYTE ESTERASE UR QL STRIP: ABNORMAL
MCH RBC QN AUTO: 29 PG (ref 26.8–34.3)
MCHC RBC AUTO-ENTMCNC: 31.9 G/DL (ref 31.4–37.4)
MCV RBC AUTO: 91 FL (ref 82–98)
NITRITE UR QL STRIP: NEGATIVE
NON-SQ EPI CELLS URNS QL MICRO: ABNORMAL /HPF
PH UR STRIP.AUTO: 5.5 [PH] (ref 4.5–8)
PLATELET # BLD AUTO: 274 THOUSANDS/UL (ref 149–390)
PMV BLD AUTO: 10.8 FL (ref 8.9–12.7)
POTASSIUM SERPL-SCNC: 4 MMOL/L (ref 3.5–5.3)
PROT SERPL-MCNC: 7.6 G/DL (ref 6.4–8.2)
PROT UR STRIP-MCNC: NEGATIVE MG/DL
PSA SERPL-MCNC: 12.8 NG/ML (ref 0–4)
RBC # BLD AUTO: 4.73 MILLION/UL (ref 3.88–5.62)
RBC #/AREA URNS AUTO: ABNORMAL /HPF
SL AMB  POCT GLUCOSE, UA: ABNORMAL
SL AMB LEUKOCYTE ESTERASE,UA: ABNORMAL
SL AMB POCT BILIRUBIN,UA: ABNORMAL
SL AMB POCT BLOOD,UA: ABNORMAL
SL AMB POCT CLARITY,UA: CLEAR
SL AMB POCT COLOR,UA: ABNORMAL
SL AMB POCT KETONES,UA: ABNORMAL
SL AMB POCT NITRITE,UA: ABNORMAL
SL AMB POCT PH,UA: 6
SL AMB POCT SPECIFIC GRAVITY,UA: 1.03
SL AMB POCT URINE PROTEIN: ABNORMAL
SL AMB POCT UROBILINOGEN: ABNORMAL
SODIUM SERPL-SCNC: 136 MMOL/L (ref 136–145)
SP GR UR STRIP.AUTO: 1.03 (ref 1–1.03)
UROBILINOGEN UR QL STRIP.AUTO: 0.2 E.U./DL
WBC # BLD AUTO: 15.92 THOUSAND/UL (ref 4.31–10.16)
WBC #/AREA URNS AUTO: ABNORMAL /HPF

## 2018-06-26 PROCEDURE — 80076 HEPATIC FUNCTION PANEL: CPT

## 2018-06-26 PROCEDURE — 87186 SC STD MICRODIL/AGAR DIL: CPT | Performed by: FAMILY MEDICINE

## 2018-06-26 PROCEDURE — 84153 ASSAY OF PSA TOTAL: CPT

## 2018-06-26 PROCEDURE — 36415 COLL VENOUS BLD VENIPUNCTURE: CPT

## 2018-06-26 PROCEDURE — 81003 URINALYSIS AUTO W/O SCOPE: CPT | Performed by: FAMILY MEDICINE

## 2018-06-26 PROCEDURE — 99214 OFFICE O/P EST MOD 30 MIN: CPT | Performed by: FAMILY MEDICINE

## 2018-06-26 PROCEDURE — 87086 URINE CULTURE/COLONY COUNT: CPT | Performed by: FAMILY MEDICINE

## 2018-06-26 PROCEDURE — 87077 CULTURE AEROBIC IDENTIFY: CPT | Performed by: FAMILY MEDICINE

## 2018-06-26 PROCEDURE — 85027 COMPLETE CBC AUTOMATED: CPT

## 2018-06-26 PROCEDURE — 81001 URINALYSIS AUTO W/SCOPE: CPT | Performed by: FAMILY MEDICINE

## 2018-06-26 PROCEDURE — 80048 BASIC METABOLIC PNL TOTAL CA: CPT

## 2018-06-26 RX ORDER — CIPROFLOXACIN 500 MG/1
500 TABLET, FILM COATED ORAL EVERY 12 HOURS SCHEDULED
Qty: 28 TABLET | Refills: 1 | Status: SHIPPED | OUTPATIENT
Start: 2018-06-26 | End: 2018-07-10

## 2018-06-26 NOTE — PROGRESS NOTES
Assessment/Plan: patient here today for fever, left lower groin pain, upset stomach and nasal congestion     No problem-specific Assessment & Plan notes found for this encounter  Diagnoses and all orders for this visit:    Acute prostatitis  -     Basic metabolic panel; Future  -     CBC and Platelet; Future  -     Hepatic function panel; Future  -     PSA Total, Diagnostic; Future  -     UA w Reflex to Microscopic w Reflex to Culture -Lab Collect  -     Cancel: POCT urine dip auto non-scope  -     Ambulatory referral to Urology; Future  -     ciprofloxacin (CIPRO) 500 mg tablet; Take 1 tablet (500 mg total) by mouth every 12 (twelve) hours for 14 days  -     POCT urine dip auto non-scope    Left inguinal hernia  -     Ambulatory referral to Urology; Future    Nocturia  -     POCT urine dip auto non-scope    Urine frequency  -     POCT urine dip auto non-scope    Other orders  -     Cancel: Ambulatory referral to General Surgery; Future          Subjective:      Patient ID: Paul Merino is a 63 y o  male  Fever of 102 last pm, woke with chills  This am 101 temp, given Tylenol  Thinks has sinus infection again  Also left groin pain with walking, started jogging again  Discomfort started this am   Getting up to urinate 5 times a night, sometimes has to go and goes only a little  This has been going on past 2 months  Has been urinating more frequent during the day  Groin pain started this am, started jogging 2 weeks ago  The following portions of the patient's history were reviewed and updated as appropriate: allergies, current medications, past family history, past medical history, past social history, past surgical history and problem list     Review of Systems   Constitutional: Positive for chills and fever  HENT: Positive for congestion  Respiratory: Negative  Cardiovascular: Negative  Gastrointestinal:        Is NPO since yesterday and has mild upset stomach ache    Post nasal drip making nausea  Genitourinary: Positive for decreased urine volume, difficulty urinating, frequency and urgency  Negative for genital sores, scrotal swelling and testicular pain  Objective:      /62 (BP Location: Left arm, Patient Position: Sitting, Cuff Size: Standard)   Pulse 81   Temp 98 8 °F (37 1 °C) (Oral)   Ht 5' 6" (1 676 m)   Wt 86 4 kg (190 lb 6 4 oz)   SpO2 98%   BMI 30 73 kg/m²          Physical Exam   Constitutional: He is oriented to person, place, and time  He appears well-developed and well-nourished  HENT:   Head: Normocephalic and atraumatic  Right Ear: External ear normal    Left Ear: External ear normal    Nose: Nose normal    Mouth/Throat: Oropharynx is clear and moist    Eyes: Conjunctivae and EOM are normal  Pupils are equal, round, and reactive to light  Neck: Normal range of motion  Neck supple  Cardiovascular: Normal rate, regular rhythm, normal heart sounds and intact distal pulses  Pulmonary/Chest: Effort normal and breath sounds normal    Abdominal: Soft  Bowel sounds are normal    Left inguinal hernia, reducible and little tender  Genitourinary: Penis normal  No penile tenderness  Genitourinary Comments: Prostate soft, slight enlarged but slight larger and tender right lobe  Musculoskeletal:   Left posterior innominate  Neurological: He is alert and oriented to person, place, and time  He has normal reflexes  Skin: Skin is warm and dry  Psychiatric: He has a normal mood and affect   His behavior is normal  Judgment and thought content normal

## 2018-06-26 NOTE — TELEPHONE ENCOUNTER
Per Dr Oswaldo Ball I called pt to try and schedule him for an appt in our office  There was no answer so I did l/m asking pt to call our office back to get something scheduled

## 2018-06-26 NOTE — PATIENT INSTRUCTIONS
Water for hydration, Urology evaluation and treat prostate and hernia  To lab before start Antibiotics  25 minutes spent with patient, > 50 % of time spent counseling and coordination of care

## 2018-06-29 LAB — BACTERIA UR CULT: ABNORMAL

## 2018-07-03 ENCOUNTER — OFFICE VISIT (OUTPATIENT)
Dept: UROLOGY | Facility: CLINIC | Age: 58
End: 2018-07-03
Payer: COMMERCIAL

## 2018-07-03 VITALS
DIASTOLIC BLOOD PRESSURE: 70 MMHG | HEIGHT: 67 IN | BODY MASS INDEX: 29.19 KG/M2 | SYSTOLIC BLOOD PRESSURE: 110 MMHG | WEIGHT: 186 LBS | HEART RATE: 72 BPM

## 2018-07-03 DIAGNOSIS — N41.0 ACUTE BACTERIAL PROSTATITIS: Primary | ICD-10-CM

## 2018-07-03 LAB
SL AMB  POCT GLUCOSE, UA: NORMAL
SL AMB LEUKOCYTE ESTERASE,UA: NORMAL
SL AMB POCT BILIRUBIN,UA: 1
SL AMB POCT BLOOD,UA: NORMAL
SL AMB POCT CLARITY,UA: CLEAR
SL AMB POCT COLOR,UA: YELLOW
SL AMB POCT KETONES,UA: NORMAL
SL AMB POCT NITRITE,UA: NORMAL
SL AMB POCT PH,UA: 5
SL AMB POCT SPECIFIC GRAVITY,UA: NORMAL
SL AMB POCT URINE PROTEIN: NORMAL
SL AMB POCT UROBILINOGEN: NORMAL

## 2018-07-03 PROCEDURE — 99202 OFFICE O/P NEW SF 15 MIN: CPT | Performed by: UROLOGY

## 2018-07-03 PROCEDURE — 81002 URINALYSIS NONAUTO W/O SCOPE: CPT | Performed by: UROLOGY

## 2018-07-03 NOTE — LETTER
July 3, 2018     Glorine Solders, DO  [address] [facility]  [place] [address] [address]    Patient: Juju Vo   YOB: 1960   Date of Visit: 7/3/2018       Dear Dr Leta Foster: Thank you for referring [facility] to me for evaluation  Below are my notes for this consultation  If you have questions, please do not hesitate to call me  I look forward to following your patient along with you  Sincerely,        Barbara Huntley MD        CC: No Recipients  Barbara Huntley MD  7/3/2018  5:24 PM  Sign at close encounter  Progress Note - Urology  Jujukhadijah Vo 63 y o  male MRN: [id number]  Encounter: [id number]      Chief Complaint:   Chief Complaint   Patient presents with    Prostatitis       HPI:  50-year-old male who presents for follow-up of acute prostatitis  He has no past history of prostate disease  There is no family history of prostate disease  He does however state that he has had frequency and urgency over the past several months  He denied any dysuria  But he developed an acute fever over 102  Seen by his family physician he was started on ciprofloxacin for acute prostatitis  Urine culture was E coli  His PSA was 12 8 which is consistent with the prostatitis  PSA previously in 2016 was 1 6  His white count was also elevated to 15 9 k  He is feeling better at this time      MEDS:    Current Outpatient Prescriptions:     oxyCODONE (ROXICODONE) 5 mg immediate release tablet, 1-2 tablets every 4 hours as needed for pain , Disp: 40 tablet, Rfl: 0    acyclovir (ZOVIRAX) 400 MG tablet, Take 1 tablet (400 mg total) by mouth 2 (two) times a day for 60 days, Disp: 10 tablet, Rfl: 2    ciprofloxacin (CIPRO) 500 mg tablet, Take 1 tablet (500 mg total) by mouth every 12 (twelve) hours for 14 days, Disp: 28 tablet, Rfl: 1    ketoconazole (NIZORAL) 200 mg tablet, Take 200 mg by mouth daily, Disp: , Rfl:     tadalafil (CIALIS) 5 MG tablet, Take 5 mg by mouth daily as needed for erectile dysfunction, Disp: , Rfl:       PMH:  Past Medical History:   Diagnosis Date    Hyperlipidemia     Impingement syndrome of left shoulder          PSH  Past Surgical History:   Procedure Laterality Date    KNEE ARTHROSCOPY      LEFT MENISCUC    NASAL SINUS SURGERY      ME ARTHROSCOPY SHOULDER SURGICAL BICEPS TENODESIS Left 5/9/2017    Procedure: ARTHROSCOPIC BICEPS TENODESIS;  Surgeon: Dakota Diaz MD;  Location: [facility];  Service: Orthopedics    ME Radha Gore Left 5/9/2017    Procedure: SHOULDER ARTHROSCOPY; ROTATOR CUFF REPAIR;  Surgeon: Dakota Diaz MD;  Location: [facility];  Service: Orthopedics         ROS:  Review of Systems   Constitutional: Negative  Respiratory: Negative  Cardiovascular: Negative  Neurological: Negative  Psychiatric/Behavioral: Negative  Vitals:  Blood pressure 110/70, pulse 72, height 5' 7" (1 702 m), weight 84 4 kg (186 lb)  Physical Exam:     General Appearance    Well-developed male currently in no acute distress  Cardiac   Heart rate regular  Pulmonary   Breathing shows normal excursion of the diaphragm and not labored  Abdomen   Soft nontender  No masses  No liver or spleen enlargement  Back    No CVA tenderness  Genitalia   There is a left inguinal hernia present  The penis is uncircumcised  There is no discharge  There is no inguinal adenopathy  Both testes are unremarkable  Rectal examination shows normal sphincter tone  The prostate is not tender  The prostate is  Small  There is no nodularity    Extremities     No edema  Neurologic   Grossly physiologic    Lab, Imaging and other studies:  Recent Results (from the past 672 hour(s))   POCT urine dip auto non-scope    Collection Time: 06/26/18 11:49 AM   Result Value Ref Range     COLOR,UA Dark meri      CLARITY,UA clear      SPECIFIC GRAVITY,UA 1 030      PH,UA 6 0     LEUKOCYTE ESTERASE,UA +-      NITRITE,UA -     GLUCOSE, UA -      KETONES,UA 2+      BILIRUBIN,UA - BLOOD,UA -     SL AMB POCT URINE PROTEIN +-     SL AMB POCT UROBILINOGEN -    UA w Reflex to Microscopic w Reflex to Culture -Lab Collect    Collection Time: 06/26/18 12:03 PM   Result Value Ref Range    Color, UA Dk Yellow     Clarity, UA Clear     Specific Gravity, UA 1 034 (H) 1 003 - 1 030    pH, UA 5 5 4 5 - 8 0    Leukocytes, UA Small (A) Negative    Nitrite, UA Negative Negative    Protein, UA Negative Negative mg/dl    Glucose, UA Negative Negative mg/dl    Ketones, UA >=80 (3+) (A) Negative mg/dl    Urobilinogen, UA 0 2 0 2, 1 0 E U /dl E U /dl    Bilirubin, UA Interference- unable to analyze (A) Negative    Blood, UA Negative Negative   Basic metabolic panel    Collection Time: 06/26/18 12:03 PM   Result Value Ref Range    Sodium 136 136 - 145 mmol/L    Potassium 4 0 3 5 - 5 3 mmol/L    Chloride 104 100 - 108 mmol/L    CO2 23 21 - 32 mmol/L    Anion Gap 9 4 - 13 mmol/L    BUN 15 5 - 25 mg/dL    Creatinine 1 14 0 60 - 1 30 mg/dL    Glucose, Fasting 82 65 - 99 mg/dL    Calcium 9 3 8 3 - 10 1 mg/dL    eGFR 71 ml/min/1 73sq m   CBC and Platelet    Collection Time: 06/26/18 12:03 PM   Result Value Ref Range    WBC 15 92 (H) 4 31 - 10 16 Thousand/uL    RBC 4 73 3 88 - 5 62 Million/uL    Hemoglobin 13 7 12 0 - 17 0 g/dL    Hematocrit 43 0 36 5 - 49 3 %    MCV 91 82 - 98 fL    MCH 29 0 26 8 - 34 3 pg    MCHC 31 9 31 4 - 37 4 g/dL    RDW 12 9 11 6 - 15 1 %    Platelets 434 874 - 334 Thousands/uL    MPV 10 8 8 9 - 12 7 fL   Hepatic function panel    Collection Time: 06/26/18 12:03 PM   Result Value Ref Range    Total Bilirubin 1 21 (H) 0 20 - 1 00 mg/dL    Bilirubin, Direct 0 30 (H) 0 00 - 0 20 mg/dL    Alkaline Phosphatase 83 46 - 116 U/L    AST 33 5 - 45 U/L    ALT 75 12 - 78 U/L    Total Protein 7 6 6 4 - 8 2 g/dL    Albumin 3 9 3 5 - 5 0 g/dL   PSA Total, Diagnostic    Collection Time: 06/26/18 12:03 PM   Result Value Ref Range    PSA 12 8 (H) 0 0 - 4 0 ng/mL   Urine Microscopic    Collection Time: 06/26/18 12:03 PM   Result Value Ref Range    RBC, UA None Seen None Seen, 0-5 /hpf    WBC, UA Innumerable (A) None Seen, 0-5, 5-55, 5-65 /hpf    Epithelial Cells None Seen None Seen, Occasional /hpf    Bacteria, UA Moderate (A) None Seen, Occasional /hpf    Hyaline Casts, UA 10-25 (A) None Seen /lpf   Urine culture    Collection Time: 06/26/18 12:03 PM   Result Value Ref Range    Urine Culture >100,000 cfu/ml Escherichia coli (A)        Susceptibility    Escherichia coli - PANCHITO     ZID Performed Yes       Ampicillin ($$) >16 00 Resistant ug/ml     Ampicillin + Sulbactam ($) 16/8 Intermediate ug/ml     Aztreonam ($$$)  <=4 Susceptible ug/ml     Cefazolin ($) <=2 00 Susceptible ug/ml     Ciprofloxacin ($)  <=1 00 Susceptible ug/ml     Ertapenem ($$$) <=0 5 Susceptible ug/ml     Gentamicin ($$) <=1 Susceptible ug/ml     Levofloxacin ($) <=0 25 Susceptible ug/ml     Nitrofurantoin <=32 Susceptible ug/ml     Tetracycline <=4 Susceptible ug/ml     Tobramycin ($) <=1 Susceptible ug/ml     Trimethoprim + Sulfamethoxazole ($$$) <=2/38 Susceptible ug/ml   POCT urine dip    Collection Time: 07/03/18  5:13 PM   Result Value Ref Range    LEUKOCYTE ESTERASE,UA NEG      NITRITE,UA NEG     SL AMB POCT UROBILINOGEN NEG     SL AMB POCT URINE PROTEIN NEG      PH,UA 5      BLOOD,UA NEG      SPECIFIC GRAVITY,UA NEG      KETONES,UA NEG      BILIRUBIN,UA 1 005     GLUCOSE, UA NEG      COLOR,UA Yellow      CLARITY,UA Clear            IMPRESSION:   1  Acute bacterial prostatitis currently under treatment   2  Left inguinal hernia   3  Elevated PSA secondary to prostatitis      PLAN:   complete an 18 day course of ciprofloxacin  I will recheck his PSA in approximately 3 months  The inguinal hernia is asymptomatic  We will discuss this at a later time  We will also review his voiding pattern and frequency  He may also require a sleep study    Urinalysis today is unremarkable

## 2018-07-03 NOTE — PROGRESS NOTES
Progress Note - Urology  [facility] 63 y o  male MRN: [id number]  Encounter: [id number]      Chief Complaint:   Chief Complaint   Patient presents with    Prostatitis       HPI:  50-year-old male who presents for follow-up of acute prostatitis  He has no past history of prostate disease  There is no family history of prostate disease  He does however state that he has had frequency and urgency over the past several months  He denied any dysuria  But he developed an acute fever over 102  Seen by his family physician he was started on ciprofloxacin for acute prostatitis  Urine culture was E coli  His PSA was 12 8 which is consistent with the prostatitis  PSA previously in 2016 was 1 6  His white count was also elevated to 15 9 k  He is feeling better at this time      MEDS:    Current Outpatient Prescriptions:     oxyCODONE (ROXICODONE) 5 mg immediate release tablet, 1-2 tablets every 4 hours as needed for pain , Disp: 40 tablet, Rfl: 0    acyclovir (ZOVIRAX) 400 MG tablet, Take 1 tablet (400 mg total) by mouth 2 (two) times a day for 60 days, Disp: 10 tablet, Rfl: 2    ciprofloxacin (CIPRO) 500 mg tablet, Take 1 tablet (500 mg total) by mouth every 12 (twelve) hours for 14 days, Disp: 28 tablet, Rfl: 1    ketoconazole (NIZORAL) 200 mg tablet, Take 200 mg by mouth daily, Disp: , Rfl:     tadalafil (CIALIS) 5 MG tablet, Take 5 mg by mouth daily as needed for erectile dysfunction, Disp: , Rfl:       PMH:  Past Medical History:   Diagnosis Date    Hyperlipidemia     Impingement syndrome of left shoulder          PSH  Past Surgical History:   Procedure Laterality Date    KNEE ARTHROSCOPY      LEFT MENISCUC    NASAL SINUS SURGERY      WA ARTHROSCOPY SHOULDER SURGICAL BICEPS TENODESIS Left 5/9/2017    Procedure: ARTHROSCOPIC BICEPS TENODESIS;  Surgeon: Ag Guajardo MD;  Location: [facility];  Service: Orthopedics    [place] [address] Left 5/9/2017    Procedure: QEFHFDLL ARTHROSCOPY; ROTATOR CUFF REPAIR;  Surgeon: Juan David Robertson MD;  Location: [facility];  Service: Orthopedics         ROS:  Review of Systems   Constitutional: Negative  Respiratory: Negative  Cardiovascular: Negative  Neurological: Negative  Psychiatric/Behavioral: Negative  Vitals:  Blood pressure 110/70, pulse 72, height 5' 7" (1 702 m), weight 84 4 kg (186 lb)  Physical Exam:     General Appearance    Well-developed male currently in no acute distress  Cardiac   Heart rate regular  Pulmonary   Breathing shows normal excursion of the diaphragm and not labored  Abdomen   Soft nontender  No masses  No liver or spleen enlargement  Back    No CVA tenderness  Genitalia   There is a left inguinal hernia present  The penis is uncircumcised  There is no discharge  There is no inguinal adenopathy  Both testes are unremarkable  Rectal examination shows normal sphincter tone  The prostate is not tender  The prostate is  Small  There is no nodularity    Extremities     No edema  Neurologic   Grossly physiologic    Lab, Imaging and other studies:  Recent Results (from the past 672 hour(s))   POCT urine dip auto non-scope    Collection Time: 06/26/18 11:49 AM   Result Value Ref Range     COLOR,UA Dark meri      CLARITY,UA clear      SPECIFIC GRAVITY,UA 1 030      PH,UA 6 0     LEUKOCYTE ESTERASE,UA +-      NITRITE,UA -     GLUCOSE, UA -      KETONES,UA 2+      BILIRUBIN,UA -      BLOOD,UA -     SL AMB POCT URINE PROTEIN +-     SL AMB POCT UROBILINOGEN -    UA w Reflex to Microscopic w Reflex to Culture -Lab Collect    Collection Time: 06/26/18 12:03 PM   Result Value Ref Range    Color, UA Dk Yellow     Clarity, UA Clear     Specific Gravity, UA 1 034 (H) 1 003 - 1 030    pH, UA 5 5 4 5 - 8 0    Leukocytes, UA Small (A) Negative    Nitrite, UA Negative Negative    Protein, UA Negative Negative mg/dl    Glucose, UA Negative Negative mg/dl    Ketones, UA >=80 (3+) (A) Negative mg/dl Urobilinogen, UA 0 2 0 2, 1 0 E U /dl E U /dl    Bilirubin, UA Interference- unable to analyze (A) Negative    Blood, UA Negative Negative   Basic metabolic panel    Collection Time: 06/26/18 12:03 PM   Result Value Ref Range    Sodium 136 136 - 145 mmol/L    Potassium 4 0 3 5 - 5 3 mmol/L    Chloride 104 100 - 108 mmol/L    CO2 23 21 - 32 mmol/L    Anion Gap 9 4 - 13 mmol/L    BUN 15 5 - 25 mg/dL    Creatinine 1 14 0 60 - 1 30 mg/dL    Glucose, Fasting 82 65 - 99 mg/dL    Calcium 9 3 8 3 - 10 1 mg/dL    eGFR 71 ml/min/1 73sq m   CBC and Platelet    Collection Time: 06/26/18 12:03 PM   Result Value Ref Range    WBC 15 92 (H) 4 31 - 10 16 Thousand/uL    RBC 4 73 3 88 - 5 62 Million/uL    Hemoglobin 13 7 12 0 - 17 0 g/dL    Hematocrit 43 0 36 5 - 49 3 %    MCV 91 82 - 98 fL    MCH 29 0 26 8 - 34 3 pg    MCHC 31 9 31 4 - 37 4 g/dL    RDW 12 9 11 6 - 15 1 %    Platelets 619 403 - 242 Thousands/uL    MPV 10 8 8 9 - 12 7 fL   Hepatic function panel    Collection Time: 06/26/18 12:03 PM   Result Value Ref Range    Total Bilirubin 1 21 (H) 0 20 - 1 00 mg/dL    Bilirubin, Direct 0 30 (H) 0 00 - 0 20 mg/dL    Alkaline Phosphatase 83 46 - 116 U/L    AST 33 5 - 45 U/L    ALT 75 12 - 78 U/L    Total Protein 7 6 6 4 - 8 2 g/dL    Albumin 3 9 3 5 - 5 0 g/dL   PSA Total, Diagnostic    Collection Time: 06/26/18 12:03 PM   Result Value Ref Range    PSA 12 8 (H) 0 0 - 4 0 ng/mL   Urine Microscopic    Collection Time: 06/26/18 12:03 PM   Result Value Ref Range    RBC, UA None Seen None Seen, 0-5 /hpf    WBC, UA Innumerable (A) None Seen, 0-5, 5-55, 5-65 /hpf    Epithelial Cells None Seen None Seen, Occasional /hpf    Bacteria, UA Moderate (A) None Seen, Occasional /hpf    Hyaline Casts, UA 10-25 (A) None Seen /lpf   Urine culture    Collection Time: 06/26/18 12:03 PM   Result Value Ref Range    Urine Culture >100,000 cfu/ml Escherichia coli (A)        Susceptibility    Escherichia coli - PANCHITO     ZID Performed Yes       Ampicillin ($$) >16 00 Resistant ug/ml     Ampicillin + Sulbactam ($) 16/8 Intermediate ug/ml     Aztreonam ($$$)  <=4 Susceptible ug/ml     Cefazolin ($) <=2 00 Susceptible ug/ml     Ciprofloxacin ($)  <=1 00 Susceptible ug/ml     Ertapenem ($$$) <=0 5 Susceptible ug/ml     Gentamicin ($$) <=1 Susceptible ug/ml     Levofloxacin ($) <=0 25 Susceptible ug/ml     Nitrofurantoin <=32 Susceptible ug/ml     Tetracycline <=4 Susceptible ug/ml     Tobramycin ($) <=1 Susceptible ug/ml     Trimethoprim + Sulfamethoxazole ($$$) <=2/38 Susceptible ug/ml   POCT urine dip    Collection Time: 07/03/18  5:13 PM   Result Value Ref Range    LEUKOCYTE ESTERASE,UA NEG      NITRITE,UA NEG     SL AMB POCT UROBILINOGEN NEG     SL AMB POCT URINE PROTEIN NEG      PH,UA 5      BLOOD,UA NEG      SPECIFIC GRAVITY,UA NEG      KETONES,UA NEG      BILIRUBIN,UA 1 005     GLUCOSE, UA NEG      COLOR,UA Yellow      CLARITY,UA Clear            IMPRESSION:   1  Acute bacterial prostatitis currently under treatment   2  Left inguinal hernia   3  Elevated PSA secondary to prostatitis      PLAN:   complete an 18 day course of ciprofloxacin  I will recheck his PSA in approximately 3 months  The inguinal hernia is asymptomatic  We will discuss this at a later time  We will also review his voiding pattern and frequency  He may also require a sleep study    Urinalysis today is unremarkable

## 2018-12-24 ENCOUNTER — OFFICE VISIT (OUTPATIENT)
Dept: FAMILY MEDICINE CLINIC | Facility: CLINIC | Age: 58
End: 2018-12-24
Payer: COMMERCIAL

## 2018-12-24 VITALS
SYSTOLIC BLOOD PRESSURE: 122 MMHG | DIASTOLIC BLOOD PRESSURE: 76 MMHG | BODY MASS INDEX: 27.75 KG/M2 | WEIGHT: 176.8 LBS | HEIGHT: 67 IN | HEART RATE: 91 BPM | OXYGEN SATURATION: 99 % | TEMPERATURE: 98.8 F

## 2018-12-24 DIAGNOSIS — J01.01 ACUTE RECURRENT MAXILLARY SINUSITIS: Primary | ICD-10-CM

## 2018-12-24 DIAGNOSIS — R05.9 COUGH: ICD-10-CM

## 2018-12-24 DIAGNOSIS — B00.1 RECURRENT COLD SORES: ICD-10-CM

## 2018-12-24 PROCEDURE — 1036F TOBACCO NON-USER: CPT | Performed by: FAMILY MEDICINE

## 2018-12-24 PROCEDURE — 99213 OFFICE O/P EST LOW 20 MIN: CPT | Performed by: FAMILY MEDICINE

## 2018-12-24 RX ORDER — AZITHROMYCIN 250 MG/1
TABLET, FILM COATED ORAL
Qty: 6 TABLET | Refills: 0 | Status: SHIPPED | OUTPATIENT
Start: 2018-12-24 | End: 2018-12-28

## 2018-12-24 RX ORDER — PREDNISONE 10 MG/1
10 TABLET ORAL DAILY
Qty: 19 TABLET | Refills: 0 | Status: SHIPPED | OUTPATIENT
Start: 2018-12-24 | End: 2019-05-08

## 2018-12-24 RX ORDER — PROMETHAZINE HYDROCHLORIDE AND CODEINE PHOSPHATE 6.25; 1 MG/5ML; MG/5ML
5 SYRUP ORAL EVERY 6 HOURS PRN
Qty: 120 ML | Refills: 0 | Status: SHIPPED | OUTPATIENT
Start: 2018-12-24 | End: 2019-05-08

## 2018-12-24 RX ORDER — ACYCLOVIR 400 MG/1
TABLET ORAL
Qty: 60 TABLET | Refills: 1 | Status: SHIPPED | OUTPATIENT
Start: 2018-12-24 | End: 2019-12-04 | Stop reason: SDUPTHER

## 2018-12-24 NOTE — PROGRESS NOTES
Assessment/Plan: patient here today for cold sore, runny nose,stuffy nose,scratchy throat  and productive cough     No problem-specific Assessment & Plan notes found for this encounter  Diagnoses and all orders for this visit:    Acute recurrent maxillary sinusitis  -     azithromycin (ZITHROMAX) 250 mg tablet; Take 2 tablets today then 1 tablet daily x 4 days  -     predniSONE 10 mg tablet; Take 1 tablet (10 mg total) by mouth daily Take #3 for 3 days  #2 for 3 days  #1 for 4 days  Cough  -     promethazine-codeine (PHENERGAN WITH CODEINE) 6 25-10 mg/5 mL syrup; Take 5 mL by mouth every 6 (six) hours as needed for cough    Recurrent cold sores  -     acyclovir (ZOVIRAX) 400 MG tablet; One po BID for 5 days  Subjective:      Patient ID: Gevena Rubinstein is a 63 y o  male  Sinus infection and cold sores for one week  The following portions of the patient's history were reviewed and updated as appropriate: allergies, current medications, past family history, past medical history, past social history, past surgical history and problem list     Current Outpatient Prescriptions:     acyclovir (ZOVIRAX) 400 MG tablet, One po BID for 5 days  , Disp: 60 tablet, Rfl: 1    azithromycin (ZITHROMAX) 250 mg tablet, Take 2 tablets today then 1 tablet daily x 4 days, Disp: 6 tablet, Rfl: 0    predniSONE 10 mg tablet, Take 1 tablet (10 mg total) by mouth daily Take #3 for 3 days #2 for 3 days #1 for 4 days  , Disp: 19 tablet, Rfl: 0    promethazine-codeine (PHENERGAN WITH CODEINE) 6 25-10 mg/5 mL syrup, Take 5 mL by mouth every 6 (six) hours as needed for cough, Disp: 120 mL, Rfl: 0    tadalafil (CIALIS) 5 MG tablet, Take 5 mg by mouth daily as needed for erectile dysfunction, Disp: , Rfl:     Review of Systems   Constitutional: Negative  HENT: Positive for congestion  Eyes: Negative  Respiratory: Negative  Cardiovascular: Negative  Musculoskeletal: Negative  Skin: Negative  Neurological: Negative  Objective:      /76 (BP Location: Left arm, Patient Position: Sitting, Cuff Size: Standard)   Pulse 91   Temp 98 8 °F (37 1 °C) (Oral)   Ht 5' 7" (1 702 m)   Wt 80 2 kg (176 lb 12 8 oz)   SpO2 99%   BMI 27 69 kg/m²          Physical Exam   Constitutional: He appears well-developed and well-nourished  HENT:   Head: Normocephalic and atraumatic  Right Ear: External ear normal    Left Ear: External ear normal    Mouth/Throat: Oropharynx is clear and moist    Yellow nasal DC  Cardiovascular: Normal rate, regular rhythm and normal heart sounds  Pulmonary/Chest: Effort normal and breath sounds normal    Skin: Skin is warm and dry

## 2019-05-08 ENCOUNTER — OFFICE VISIT (OUTPATIENT)
Dept: FAMILY MEDICINE CLINIC | Facility: CLINIC | Age: 59
End: 2019-05-08
Payer: COMMERCIAL

## 2019-05-08 VITALS
HEIGHT: 67 IN | WEIGHT: 187 LBS | OXYGEN SATURATION: 99 % | SYSTOLIC BLOOD PRESSURE: 124 MMHG | TEMPERATURE: 98.4 F | DIASTOLIC BLOOD PRESSURE: 72 MMHG | HEART RATE: 71 BPM | BODY MASS INDEX: 29.35 KG/M2

## 2019-05-08 DIAGNOSIS — J01.01 ACUTE RECURRENT MAXILLARY SINUSITIS: Primary | ICD-10-CM

## 2019-05-08 PROCEDURE — 3008F BODY MASS INDEX DOCD: CPT | Performed by: FAMILY MEDICINE

## 2019-05-08 PROCEDURE — 99213 OFFICE O/P EST LOW 20 MIN: CPT | Performed by: FAMILY MEDICINE

## 2019-05-08 RX ORDER — AZITHROMYCIN 250 MG/1
TABLET, FILM COATED ORAL
Qty: 6 TABLET | Refills: 0 | Status: SHIPPED | OUTPATIENT
Start: 2019-05-08 | End: 2019-05-12

## 2019-05-08 RX ORDER — AZELASTINE 1 MG/ML
1 SPRAY, METERED NASAL 2 TIMES DAILY
Qty: 1 BOTTLE | Refills: 3 | Status: SHIPPED | OUTPATIENT
Start: 2019-05-08 | End: 2020-02-04 | Stop reason: ALTCHOICE

## 2019-05-08 RX ORDER — PREDNISONE 10 MG/1
10 TABLET ORAL DAILY
Qty: 20 TABLET | Refills: 0 | Status: SHIPPED | OUTPATIENT
Start: 2019-05-08 | End: 2019-08-02

## 2019-08-02 ENCOUNTER — OFFICE VISIT (OUTPATIENT)
Dept: FAMILY MEDICINE CLINIC | Facility: CLINIC | Age: 59
End: 2019-08-02
Payer: COMMERCIAL

## 2019-08-02 VITALS
BODY MASS INDEX: 27.65 KG/M2 | HEART RATE: 72 BPM | OXYGEN SATURATION: 99 % | SYSTOLIC BLOOD PRESSURE: 116 MMHG | TEMPERATURE: 98.1 F | HEIGHT: 67 IN | WEIGHT: 176.2 LBS | DIASTOLIC BLOOD PRESSURE: 72 MMHG

## 2019-08-02 DIAGNOSIS — E55.9 VITAMIN D DEFICIENCY: ICD-10-CM

## 2019-08-02 DIAGNOSIS — Z11.59 NEED FOR HEPATITIS C SCREENING TEST: Primary | ICD-10-CM

## 2019-08-02 DIAGNOSIS — Z12.5 PROSTATE CANCER SCREENING: ICD-10-CM

## 2019-08-02 DIAGNOSIS — Z00.00 HEALTH CARE MAINTENANCE: ICD-10-CM

## 2019-08-02 PROCEDURE — 99396 PREV VISIT EST AGE 40-64: CPT | Performed by: FAMILY MEDICINE

## 2019-08-02 NOTE — PROGRESS NOTES
Chief Complaint   Patient presents with    Physical Exam    Well Check    BW order     Assessment/Plan:         Diagnoses and all orders for this visit:    Need for hepatitis C screening test  -     Hepatitis C antibody; Future    Health care maintenance  -     CBC and Platelet; Future  -     Basic metabolic panel; Future  -     Hepatic function panel; Future  -     Lipid panel; Future    Vitamin D deficiency  -     Vitamin D 25 hydroxy; Future    Prostate cancer screening  -     PSA, Total Screen; Future          Subjective:      Patient ID: Joe Persaud is a 63 y o  male  Health main and needs lab work  PMH: Fall allergies, no recent sinus issues  PSH:  No changes  SH: neg tob or [place]  FH: M and father healthy  Had colonoscopy 2 years ago  The following portions of the patient's history were reviewed and updated as appropriate: past family history and past surgical history, SH, allergies  Review of Systems   Constitutional: Negative  HENT: Negative  Eyes: Negative  Respiratory: Negative  Cardiovascular: Negative  Gastrointestinal: Negative  Genitourinary: Negative  Musculoskeletal: Negative  Skin: Negative  Neurological: Negative  Psychiatric/Behavioral: Negative  Objective:      /72 (BP Location: Left arm, Patient Position: Sitting, Cuff Size: Standard)   Pulse 72   Temp 98 1 °F (36 7 °C) (Oral)   Ht 5' 7" (1 702 m)   Wt 79 9 kg (176 lb 3 2 oz)   SpO2 99%   BMI 27 60 kg/m²      Current Outpatient Medications:     acyclovir (ZOVIRAX) 400 MG tablet, One po BID for 5 days  , Disp: 60 tablet, Rfl: 1    azelastine (ASTELIN) 0 1 % nasal spray, 1 spray into each nostril 2 (two) times a day Use in each nostril as directed, Disp: 1 Bottle, Rfl: 3    tadalafil (CIALIS) 5 MG tablet, Take 5 mg by mouth daily as needed for erectile dysfunction, Disp: , Rfl:      No Known Allergies         Physical Exam   Constitutional: He is oriented to person, place, and time  He appears well-developed and well-nourished  HENT:   Head: Normocephalic and atraumatic  Right Ear: External ear normal    Left Ear: External ear normal    Nose: Nose normal    Mouth/Throat: Oropharynx is clear and moist    Eyes: Pupils are equal, round, and reactive to light  Conjunctivae and EOM are normal    Neck: Normal range of motion  Neck supple  Cardiovascular: Normal rate, regular rhythm, normal heart sounds and intact distal pulses  Pulmonary/Chest: Effort normal and breath sounds normal    Abdominal: Soft  Bowel sounds are normal    Genitourinary: Prostate normal    Neurological: He is alert and oriented to person, place, and time  He has normal reflexes  Skin: Skin is warm and dry  Psychiatric: He has a normal mood and affect   His behavior is normal  Judgment and thought content normal

## 2019-08-02 NOTE — PROGRESS NOTES
BMI Counseling: Body mass index is 27 6 kg/m²  Discussed the patient's BMI with him  The BMI is above average  BMI counseling and education was provided to the patient  Nutrition recommendations include moderation in carbohydrate intake and increasing intake of lean protein

## 2019-08-05 ENCOUNTER — APPOINTMENT (OUTPATIENT)
Dept: LAB | Facility: CLINIC | Age: 59
End: 2019-08-05
Payer: COMMERCIAL

## 2019-08-05 DIAGNOSIS — Z12.5 PROSTATE CANCER SCREENING: ICD-10-CM

## 2019-08-05 DIAGNOSIS — Z11.59 NEED FOR HEPATITIS C SCREENING TEST: ICD-10-CM

## 2019-08-05 DIAGNOSIS — E55.9 VITAMIN D DEFICIENCY: ICD-10-CM

## 2019-08-05 DIAGNOSIS — Z00.00 HEALTH CARE MAINTENANCE: ICD-10-CM

## 2019-08-05 LAB
25(OH)D3 SERPL-MCNC: 20.7 NG/ML (ref 30–100)
ALBUMIN SERPL BCP-MCNC: 4.2 G/DL (ref 3.5–5)
ALP SERPL-CCNC: 93 U/L (ref 46–116)
ALT SERPL W P-5'-P-CCNC: 37 U/L (ref 12–78)
ANION GAP SERPL CALCULATED.3IONS-SCNC: 11 MMOL/L (ref 4–13)
AST SERPL W P-5'-P-CCNC: 27 U/L (ref 5–45)
BILIRUB DIRECT SERPL-MCNC: 0.16 MG/DL (ref 0–0.2)
BILIRUB SERPL-MCNC: 1 MG/DL (ref 0.2–1)
BUN SERPL-MCNC: 22 MG/DL (ref 5–25)
CALCIUM SERPL-MCNC: 9.5 MG/DL (ref 8.3–10.1)
CHLORIDE SERPL-SCNC: 104 MMOL/L (ref 100–108)
CHOLEST SERPL-MCNC: 263 MG/DL (ref 50–200)
CO2 SERPL-SCNC: 26 MMOL/L (ref 21–32)
CREAT SERPL-MCNC: 1.19 MG/DL (ref 0.6–1.3)
ERYTHROCYTE [DISTWIDTH] IN BLOOD BY AUTOMATED COUNT: 12.6 % (ref 11.6–15.1)
GFR SERPL CREATININE-BSD FRML MDRD: 67 ML/MIN/1.73SQ M
GLUCOSE P FAST SERPL-MCNC: 96 MG/DL (ref 65–99)
HCT VFR BLD AUTO: 46.3 % (ref 36.5–49.3)
HCV AB SER QL: NORMAL
HDLC SERPL-MCNC: 51 MG/DL (ref 40–60)
HGB BLD-MCNC: 14.9 G/DL (ref 12–17)
LDLC SERPL CALC-MCNC: 186 MG/DL (ref 0–100)
MCH RBC QN AUTO: 28.9 PG (ref 26.8–34.3)
MCHC RBC AUTO-ENTMCNC: 32.2 G/DL (ref 31.4–37.4)
MCV RBC AUTO: 90 FL (ref 82–98)
NONHDLC SERPL-MCNC: 212 MG/DL
PLATELET # BLD AUTO: 286 THOUSANDS/UL (ref 149–390)
PMV BLD AUTO: 9.7 FL (ref 8.9–12.7)
POTASSIUM SERPL-SCNC: 4 MMOL/L (ref 3.5–5.3)
PROT SERPL-MCNC: 8.1 G/DL (ref 6.4–8.2)
PSA SERPL-MCNC: 1.7 NG/ML (ref 0–4)
RBC # BLD AUTO: 5.15 MILLION/UL (ref 3.88–5.62)
SODIUM SERPL-SCNC: 141 MMOL/L (ref 136–145)
TRIGL SERPL-MCNC: 132 MG/DL
WBC # BLD AUTO: 6.38 THOUSAND/UL (ref 4.31–10.16)

## 2019-08-05 PROCEDURE — 85027 COMPLETE CBC AUTOMATED: CPT

## 2019-08-05 PROCEDURE — 36415 COLL VENOUS BLD VENIPUNCTURE: CPT

## 2019-08-05 PROCEDURE — 80076 HEPATIC FUNCTION PANEL: CPT

## 2019-08-05 PROCEDURE — 80048 BASIC METABOLIC PNL TOTAL CA: CPT

## 2019-08-05 PROCEDURE — 86803 HEPATITIS C AB TEST: CPT

## 2019-08-05 PROCEDURE — 82306 VITAMIN D 25 HYDROXY: CPT

## 2019-08-05 PROCEDURE — G0103 PSA SCREENING: HCPCS

## 2019-08-05 PROCEDURE — 80061 LIPID PANEL: CPT

## 2019-08-13 ENCOUNTER — TELEPHONE (OUTPATIENT)
Dept: FAMILY MEDICINE CLINIC | Facility: CLINIC | Age: 59
End: 2019-08-13

## 2019-08-13 NOTE — TELEPHONE ENCOUNTER
----- Message from Ramy Grimes DO sent at 8/12/2019  2:48 PM EDT -----  Vitamin D is low    PSA is normal

## 2019-12-04 ENCOUNTER — OFFICE VISIT (OUTPATIENT)
Dept: FAMILY MEDICINE CLINIC | Facility: CLINIC | Age: 59
End: 2019-12-04
Payer: COMMERCIAL

## 2019-12-04 VITALS
DIASTOLIC BLOOD PRESSURE: 70 MMHG | BODY MASS INDEX: 27.25 KG/M2 | OXYGEN SATURATION: 98 % | HEART RATE: 87 BPM | TEMPERATURE: 98.2 F | WEIGHT: 173.6 LBS | RESPIRATION RATE: 15 BRPM | SYSTOLIC BLOOD PRESSURE: 118 MMHG | HEIGHT: 67 IN

## 2019-12-04 DIAGNOSIS — B00.1 RECURRENT COLD SORES: ICD-10-CM

## 2019-12-04 DIAGNOSIS — R05.9 COUGH: Primary | ICD-10-CM

## 2019-12-04 PROCEDURE — 3008F BODY MASS INDEX DOCD: CPT | Performed by: FAMILY MEDICINE

## 2019-12-04 PROCEDURE — 99213 OFFICE O/P EST LOW 20 MIN: CPT | Performed by: FAMILY MEDICINE

## 2019-12-04 RX ORDER — PROMETHAZINE HYDROCHLORIDE AND CODEINE PHOSPHATE 6.25; 1 MG/5ML; MG/5ML
5 SYRUP ORAL EVERY 6 HOURS PRN
Qty: 120 ML | Refills: 0 | Status: SHIPPED | OUTPATIENT
Start: 2019-12-04

## 2019-12-04 RX ORDER — ACYCLOVIR 400 MG/1
TABLET ORAL
Qty: 60 TABLET | Refills: 1 | Status: SHIPPED | OUTPATIENT
Start: 2019-12-04 | End: 2021-03-30 | Stop reason: SDUPTHER

## 2019-12-04 NOTE — PROGRESS NOTES
Chief Complaint   Patient presents with    Cough     for 5 days    Earache    Nasal Congestion       Assessment/Plan:Water for hydration  Diagnoses and all orders for this visit:    Cough  -     promethazine-codeine (PHENERGAN WITH CODEINE) 6 25-10 mg/5 mL syrup; Take 5 mL by mouth every 6 (six) hours as needed for cough    Recurrent cold sores  -     acyclovir (ZOVIRAX) 400 MG tablet; One po BID for 5 days  Subjective:      Patient ID: Oletha Gowers is a 63 y o  male  Started getting sick past Friday, 6 days ago, viral symptoms, now improved but has hard cough  Feeling little tired, is taking po fluids, appetite decreased a little  Was on a cruise last week and returned past Sunday, 4 days ago  The following portions of the patient's history were reviewed and updated as appropriate: allergies, current medications, past family history, past medical history, past social history, past surgical history and problem list     Review of Systems   Constitutional: Negative  HENT: Positive for congestion  Eyes: Negative  Respiratory: Positive for cough  Cardiovascular: Negative  Gastrointestinal: Negative  Genitourinary: Negative  Musculoskeletal: Negative  Skin: Negative  Neurological: Negative  Psychiatric/Behavioral: Negative  Objective:      /70 (BP Location: Left arm, Patient Position: Sitting, Cuff Size: Standard)   Pulse 87   Temp 98 2 °F (36 8 °C) (Oral)   Resp 15   Ht 5' 7" (1 702 m)   Wt 78 7 kg (173 lb 9 6 oz)   SpO2 98%   BMI 27 19 kg/m²       Current Outpatient Medications:     azelastine (ASTELIN) 0 1 % nasal spray, 1 spray into each nostril 2 (two) times a day Use in each nostril as directed, Disp: 1 Bottle, Rfl: 3    acyclovir (ZOVIRAX) 400 MG tablet, One po BID for 5 days  , Disp: 60 tablet, Rfl: 1    tadalafil (CIALIS) 5 MG tablet, Take 5 mg by mouth daily as needed for erectile dysfunction, Disp: , Rfl:      Physical Exam Constitutional: He is oriented to person, place, and time  He appears well-developed and well-nourished  HENT:   Head: Normocephalic and atraumatic  Right Ear: External ear normal    Left Ear: External ear normal    Nose: Nose normal    Mouth/Throat: Oropharynx is clear and moist    Eyes: Pupils are equal, round, and reactive to light  Conjunctivae and EOM are normal    Neck: Normal range of motion  Neck supple  Cardiovascular: Normal rate, regular rhythm and normal heart sounds  Pulmonary/Chest: Effort normal and breath sounds normal    Neurological: He is alert and oriented to person, place, and time  He has normal reflexes  Skin: Skin is warm and dry  Psychiatric: He has a normal mood and affect   His behavior is normal  Judgment and thought content normal

## 2019-12-09 ENCOUNTER — TELEPHONE (OUTPATIENT)
Dept: FAMILY MEDICINE CLINIC | Facility: CLINIC | Age: 59
End: 2019-12-09

## 2019-12-09 DIAGNOSIS — J01.11 ACUTE RECURRENT FRONTAL SINUSITIS: Primary | ICD-10-CM

## 2019-12-09 RX ORDER — AZITHROMYCIN 250 MG/1
TABLET, FILM COATED ORAL
Qty: 6 TABLET | Refills: 0 | Status: SHIPPED | OUTPATIENT
Start: 2019-12-09 | End: 2019-12-14

## 2019-12-09 RX ORDER — METHYLPREDNISOLONE 4 MG/1
TABLET ORAL
Qty: 21 EACH | Refills: 0 | Status: SHIPPED | OUTPATIENT
Start: 2019-12-09 | End: 2021-03-30 | Stop reason: SDUPTHER

## 2019-12-09 NOTE — TELEPHONE ENCOUNTER
Patient called and said he is taking the cough syrup but still has a stuffy nose  Patient has a hard time breathing through nose and feels uncomfortable  Patient said medrol dose zohreh and zpak may help with symptoms  Please advise

## 2019-12-26 DIAGNOSIS — B00.1 RECURRENT COLD SORES: ICD-10-CM

## 2019-12-30 RX ORDER — ACYCLOVIR 400 MG/1
TABLET ORAL
Qty: 60 TABLET | Refills: 1 | OUTPATIENT
Start: 2019-12-30

## 2020-02-03 ENCOUNTER — OFFICE VISIT (OUTPATIENT)
Dept: FAMILY MEDICINE CLINIC | Facility: CLINIC | Age: 60
End: 2020-02-03
Payer: COMMERCIAL

## 2020-02-03 VITALS
RESPIRATION RATE: 15 BRPM | BODY MASS INDEX: 27.4 KG/M2 | HEIGHT: 67 IN | HEART RATE: 86 BPM | WEIGHT: 174.6 LBS | TEMPERATURE: 98.6 F | SYSTOLIC BLOOD PRESSURE: 110 MMHG | DIASTOLIC BLOOD PRESSURE: 78 MMHG

## 2020-02-03 DIAGNOSIS — J00 ACUTE RHINITIS: ICD-10-CM

## 2020-02-03 DIAGNOSIS — J01.00 ACUTE NON-RECURRENT MAXILLARY SINUSITIS: Primary | ICD-10-CM

## 2020-02-03 DIAGNOSIS — R53.83 TIREDNESS: ICD-10-CM

## 2020-02-03 DIAGNOSIS — B00.1 RECURRENT COLD SORES: ICD-10-CM

## 2020-02-03 PROCEDURE — 3008F BODY MASS INDEX DOCD: CPT | Performed by: FAMILY MEDICINE

## 2020-02-03 PROCEDURE — 1036F TOBACCO NON-USER: CPT | Performed by: FAMILY MEDICINE

## 2020-02-03 PROCEDURE — 99214 OFFICE O/P EST MOD 30 MIN: CPT | Performed by: FAMILY MEDICINE

## 2020-02-03 RX ORDER — PREDNISONE 10 MG/1
10 TABLET ORAL DAILY
Qty: 20 TABLET | Refills: 0 | Status: SHIPPED | OUTPATIENT
Start: 2020-02-03 | End: 2021-12-21 | Stop reason: SDUPTHER

## 2020-02-03 RX ORDER — FLUTICASONE PROPIONATE 50 MCG
2 SPRAY, SUSPENSION (ML) NASAL DAILY
Qty: 1 BOTTLE | Refills: 3 | Status: SHIPPED | OUTPATIENT
Start: 2020-02-03

## 2020-02-03 RX ORDER — AZITHROMYCIN 250 MG/1
TABLET, FILM COATED ORAL
Qty: 6 TABLET | Refills: 0 | Status: SHIPPED | OUTPATIENT
Start: 2020-02-03 | End: 2020-02-08

## 2020-02-03 NOTE — PROGRESS NOTES
Chief Complaint   Patient presents with    Nasal Congestion     started 3 days ago, getting over cold       Assessment/Plan:  DC Afrin, caution with rebound effect  Start Flonase 2 sprays daily in am after shower  Start tapering Prednisone and Z-Pac as directed  Discussed use of Zovirax with quanity and duration  Diagnoses and all orders for this visit:    Acute non-recurrent maxillary sinusitis  -     azithromycin (ZITHROMAX) 250 mg tablet; Take 2 tablets (500 mg total) by mouth daily for 1 day, THEN 1 tablet (250 mg total) daily for 4 days  -     predniSONE 10 mg tablet; Take 1 tablet (10 mg total) by mouth daily #3 po 3 days, #2 po 3 days then one daily  -     fluticasone (FLONASE) 50 mcg/act nasal spray; 2 sprays into each nostril daily    Tiredness    Acute rhinitis    Recurrent cold sores          Subjective:      Patient ID: Adarsh Peres is a 63 y o  male  Sinus infection since last week  BL maxillary pressure  Astelin is no help  Used Afrin last pm to get some sleep  Tired today  Uses Zovirax sparingly for cold sores  The following portions of the patient's history were reviewed and updated as appropriate: allergies, current medications, past medical history, past social history and problem list   I have spent 25 minutes with Patient  today in which greater than 50% of this time was spent in counseling/coordination of care regarding Risks and benefits of tx options, Intructions for management, Importance of tx compliance, Risk factor reductions and Impressions  Review of Systems   Constitutional: Negative  HENT: Positive for congestion, sinus pressure and sinus pain  Eyes: Negative  Respiratory: Negative  Cardiovascular: Negative  Gastrointestinal: Negative  Genitourinary: Negative  Musculoskeletal: Negative  Skin: Negative  Neurological: Negative  Psychiatric/Behavioral: Negative            Objective:      /78 (BP Location: Left arm, Patient Position: Sitting, Cuff Size: Standard)   Pulse 86   Temp 98 6 °F (37 °C) (Oral)   Resp 15   Ht 5' 7" (1 702 m)   Wt 79 2 kg (174 lb 9 6 oz)   BMI 27 35 kg/m²       Current Outpatient Medications:     acyclovir (ZOVIRAX) 400 MG tablet, One po BID for 5 days  , Disp: 60 tablet, Rfl: 1    azelastine (ASTELIN) 0 1 % nasal spray, 1 spray into each nostril 2 (two) times a day Use in each nostril as directed, Disp: 1 Bottle, Rfl: 3    methylPREDNISolone 4 MG tablet therapy pack, Use as directed on package (Patient not taking: Reported on 2/3/2020), Disp: 21 each, Rfl: 0    promethazine-codeine (PHENERGAN WITH CODEINE) 6 25-10 mg/5 mL syrup, Take 5 mL by mouth every 6 (six) hours as needed for cough (Patient not taking: Reported on 2/3/2020), Disp: 120 mL, Rfl: 0    tadalafil (CIALIS) 5 MG tablet, Take 5 mg by mouth daily as needed for erectile dysfunction, Disp: , Rfl:      Physical Exam   Constitutional: He is oriented to person, place, and time  He appears well-developed and well-nourished  HENT:   Head: Normocephalic and atraumatic  Right Ear: External ear normal    Left Ear: External ear normal    Mouth/Throat: Oropharynx is clear and moist    Mild swelling of turbinates  Distal nasal septum dry, irritated  Eyes: Pupils are equal, round, and reactive to light  Conjunctivae and EOM are normal    Neck: Normal range of motion  Neck supple  Cardiovascular: Normal rate, regular rhythm, normal heart sounds and intact distal pulses  Pulmonary/Chest: Effort normal and breath sounds normal    Abdominal: Soft  Bowel sounds are normal    Neurological: He is alert and oriented to person, place, and time  He has normal reflexes  Skin: Skin is warm and dry  Psychiatric: He has a normal mood and affect   His behavior is normal  Judgment and thought content normal

## 2020-03-27 DIAGNOSIS — B00.1 RECURRENT COLD SORES: ICD-10-CM

## 2020-03-31 RX ORDER — ACYCLOVIR 400 MG/1
TABLET ORAL
Qty: 60 TABLET | Refills: 1 | OUTPATIENT
Start: 2020-03-31

## 2020-10-20 DIAGNOSIS — J01.00 ACUTE NON-RECURRENT MAXILLARY SINUSITIS: ICD-10-CM

## 2020-10-20 RX ORDER — FLUTICASONE PROPIONATE 50 MCG
SPRAY, SUSPENSION (ML) NASAL
Qty: 16 ML | Refills: 3 | OUTPATIENT
Start: 2020-10-20

## 2021-03-30 ENCOUNTER — TELEMEDICINE (OUTPATIENT)
Dept: FAMILY MEDICINE CLINIC | Facility: CLINIC | Age: 61
End: 2021-03-30
Payer: COMMERCIAL

## 2021-03-30 DIAGNOSIS — B00.1 RECURRENT COLD SORES: ICD-10-CM

## 2021-03-30 DIAGNOSIS — J01.11 ACUTE RECURRENT FRONTAL SINUSITIS: ICD-10-CM

## 2021-03-30 PROCEDURE — 1036F TOBACCO NON-USER: CPT | Performed by: FAMILY MEDICINE

## 2021-03-30 PROCEDURE — 99213 OFFICE O/P EST LOW 20 MIN: CPT | Performed by: FAMILY MEDICINE

## 2021-03-30 RX ORDER — AZITHROMYCIN 250 MG/1
TABLET, FILM COATED ORAL
Qty: 6 TABLET | Refills: 0 | Status: SHIPPED | OUTPATIENT
Start: 2021-03-30 | End: 2021-04-04

## 2021-03-30 RX ORDER — METHYLPREDNISOLONE 4 MG/1
TABLET ORAL
Qty: 21 EACH | Refills: 0 | Status: SHIPPED | OUTPATIENT
Start: 2021-03-30

## 2021-03-30 RX ORDER — ACYCLOVIR 400 MG/1
TABLET ORAL
Qty: 60 TABLET | Refills: 1 | Status: SHIPPED | OUTPATIENT
Start: 2021-03-30 | End: 2021-12-21 | Stop reason: SDUPTHER

## 2021-03-30 NOTE — PROGRESS NOTES
Virtual Regular Visit      Assessment/Plan:  Hydrate with water  Problem List Items Addressed This Visit     None      Visit Diagnoses     Recurrent cold sores        Relevant Medications    acyclovir (ZOVIRAX) 400 MG tablet    Acute recurrent frontal sinusitis        Relevant Medications    azithromycin (Zithromax) 250 mg tablet    methylPREDNISolone 4 MG tablet therapy pack               Reason for visit is   Chief Complaint   Patient presents with    Virtual Regular Visit        Encounter provider Andrew Raymond DO    Provider located at [address] [place] [id number]      Recent Visits  No visits were found meeting these conditions  Showing recent visits within past 7 days and meeting all other requirements     Today's Visits  Date Type Provider Dept   03/30/21 23 [facility] today's visits and meeting all other requirements     Future Appointments  No visits were found meeting these conditions  Showing future appointments within next 150 days and meeting all other requirements        The patient was identified by name and date of birth  Louis Fontanez was informed that this is a telemedicine visit and that the visit is being conducted through [employer] and patient was informed that this is not a secure, HIPAA-compliant platform  He agrees to proceed     My office door was closed  No one else was in the room  He acknowledged consent and understanding of privacy and security of the video platform  The patient has agreed to participate and understands they can discontinue the visit at any time  Patient is aware this is a billable service  Subjective  Louis Fontanez is a 62 y o  male   Typical sinus infection  Getting cold sores with the sinus infection         Past Medical History:   Diagnosis Date    Hyperlipidemia     Impingement syndrome of left shoulder        Past Surgical History:   Procedure Laterality Date    KNEE ARTHROSCOPY      LEFT MENISCUC    NASAL SINUS SURGERY      [place] ARTHROSCOPY SHOULDER SURGICAL BICEPS TENODESIS Left 5/9/2017    Procedure: ARTHROSCOPIC BICEPS TENODESIS;  Surgeon: Radha Cleveland MD;  [facility];  Service: Orthopedics    AK Ayo Dhaliwal Left 5/9/2017    Procedure: SHOULDER ARTHROSCOPY; ROTATOR CUFF REPAIR;  Surgeon: Radha Cleveland MD;  [facility];  Service: Orthopedics       Current Outpatient Medications   Medication Sig Dispense Refill    acyclovir (ZOVIRAX) 400 MG tablet One po BID for 5 days  60 tablet 1    azithromycin (Zithromax) 250 mg tablet Take 2 tablets (500 mg total) by mouth daily for 1 day, THEN 1 tablet (250 mg total) daily for 4 days  6 tablet 0    fluticasone (FLONASE) 50 mcg/act nasal spray 2 sprays into each nostril daily 1 Bottle 3    methylPREDNISolone 4 MG tablet therapy pack Use as directed on package 21 each 0    predniSONE 10 mg tablet Take 1 tablet (10 mg total) by mouth daily #3 po 3 days, #2 po 3 days then one daily  20 tablet 0    promethazine-codeine (PHENERGAN WITH CODEINE) 6 25-10 mg/5 mL syrup Take 5 mL by mouth every 6 (six) hours as needed for cough (Patient not taking: Reported on 2/3/2020) 120 mL 0    tadalafil (CIALIS) 5 MG tablet Take 5 mg by mouth daily as needed for erectile dysfunction       No current facility-administered medications for this visit  No Known Allergies    Review of Systems   Constitutional: Negative  HENT: Positive for congestion and sinus pressure  Respiratory: Negative  Cardiovascular: Negative  Gastrointestinal: Negative  Musculoskeletal: Negative  Skin: Negative  Video Exam    There were no vitals filed for this visit  Physical Exam  Constitutional:       Appearance: Normal appearance  Neurological:      Mental Status: He is alert and oriented to person, place, and time     Psychiatric:         Mood and Affect: Mood normal  Behavior: Behavior normal          Thought Content: Thought content normal          Judgment: Judgment normal           I spent 15 minutes directly with the patient during this visit      Girish Humphrey acknowledges that he has consented to an online visit or consultation  He understands that the online visit is based solely on information provided by him, and that, in the absence of a face-to-face physical evaluation by the physician, the diagnosis he receives is both limited and provisional in terms of accuracy and completeness  This is not intended to replace a full medical face-to-face evaluation by the physician  Martínez Thomas understands and accepts these terms

## 2021-04-21 DIAGNOSIS — B00.1 RECURRENT COLD SORES: ICD-10-CM

## 2021-04-26 RX ORDER — ACYCLOVIR 400 MG/1
TABLET ORAL
Qty: 60 TABLET | Refills: 1 | OUTPATIENT
Start: 2021-04-26

## 2021-05-18 DIAGNOSIS — B00.1 RECURRENT COLD SORES: ICD-10-CM

## 2021-05-18 RX ORDER — ACYCLOVIR 400 MG/1
TABLET ORAL
Qty: 60 TABLET | Refills: 1 | OUTPATIENT
Start: 2021-05-18

## 2021-12-21 ENCOUNTER — OFFICE VISIT (OUTPATIENT)
Dept: FAMILY MEDICINE CLINIC | Facility: CLINIC | Age: 61
End: 2021-12-21
Payer: COMMERCIAL

## 2021-12-21 VITALS
HEIGHT: 67 IN | HEART RATE: 83 BPM | OXYGEN SATURATION: 98 % | WEIGHT: 181 LBS | DIASTOLIC BLOOD PRESSURE: 78 MMHG | BODY MASS INDEX: 28.41 KG/M2 | TEMPERATURE: 100 F | SYSTOLIC BLOOD PRESSURE: 134 MMHG

## 2021-12-21 DIAGNOSIS — B00.1 RECURRENT COLD SORES: ICD-10-CM

## 2021-12-21 DIAGNOSIS — J01.00 ACUTE NON-RECURRENT MAXILLARY SINUSITIS: Primary | ICD-10-CM

## 2021-12-21 PROCEDURE — 3725F SCREEN DEPRESSION PERFORMED: CPT | Performed by: FAMILY MEDICINE

## 2021-12-21 PROCEDURE — 1036F TOBACCO NON-USER: CPT | Performed by: FAMILY MEDICINE

## 2021-12-21 PROCEDURE — 3008F BODY MASS INDEX DOCD: CPT | Performed by: FAMILY MEDICINE

## 2021-12-21 PROCEDURE — 99214 OFFICE O/P EST MOD 30 MIN: CPT | Performed by: FAMILY MEDICINE

## 2021-12-21 RX ORDER — ACYCLOVIR 400 MG/1
TABLET ORAL
Qty: 60 TABLET | Refills: 1 | Status: SHIPPED | OUTPATIENT
Start: 2021-12-21 | End: 2022-09-13

## 2021-12-21 RX ORDER — AZITHROMYCIN 250 MG/1
TABLET, FILM COATED ORAL
Qty: 6 TABLET | Refills: 0 | Status: SHIPPED | OUTPATIENT
Start: 2021-12-21 | End: 2021-12-26

## 2021-12-21 RX ORDER — PREDNISONE 10 MG/1
10 TABLET ORAL DAILY
Qty: 20 TABLET | Refills: 0 | Status: SHIPPED | OUTPATIENT
Start: 2021-12-21 | End: 2022-01-10 | Stop reason: SDUPTHER

## 2021-12-28 ENCOUNTER — TELEPHONE (OUTPATIENT)
Dept: FAMILY MEDICINE CLINIC | Facility: CLINIC | Age: 61
End: 2021-12-28

## 2021-12-28 DIAGNOSIS — B34.9 VIRAL INFECTION, UNSPECIFIED: ICD-10-CM

## 2021-12-28 DIAGNOSIS — Z20.822 EXPOSURE TO COVID-19 VIRUS: ICD-10-CM

## 2021-12-28 PROCEDURE — U0005 INFEC AGEN DETEC AMPLI PROBE: HCPCS | Performed by: FAMILY MEDICINE

## 2021-12-28 PROCEDURE — U0003 INFECTIOUS AGENT DETECTION BY NUCLEIC ACID (DNA OR RNA); SEVERE ACUTE RESPIRATORY SYNDROME CORONAVIRUS 2 (SARS-COV-2) (CORONAVIRUS DISEASE [COVID-19]), AMPLIFIED PROBE TECHNIQUE, MAKING USE OF HIGH THROUGHPUT TECHNOLOGIES AS DESCRIBED BY CMS-2020-01-R: HCPCS | Performed by: FAMILY MEDICINE

## 2022-01-10 ENCOUNTER — OFFICE VISIT (OUTPATIENT)
Dept: FAMILY MEDICINE CLINIC | Facility: CLINIC | Age: 62
End: 2022-01-10
Payer: COMMERCIAL

## 2022-01-10 VITALS
HEART RATE: 87 BPM | HEIGHT: 67 IN | WEIGHT: 182 LBS | TEMPERATURE: 98.4 F | BODY MASS INDEX: 28.56 KG/M2 | OXYGEN SATURATION: 99 % | SYSTOLIC BLOOD PRESSURE: 130 MMHG | DIASTOLIC BLOOD PRESSURE: 80 MMHG

## 2022-01-10 DIAGNOSIS — J01.00 ACUTE NON-RECURRENT MAXILLARY SINUSITIS: Primary | ICD-10-CM

## 2022-01-10 PROCEDURE — 3008F BODY MASS INDEX DOCD: CPT | Performed by: FAMILY MEDICINE

## 2022-01-10 PROCEDURE — 1036F TOBACCO NON-USER: CPT | Performed by: FAMILY MEDICINE

## 2022-01-10 PROCEDURE — 99213 OFFICE O/P EST LOW 20 MIN: CPT | Performed by: FAMILY MEDICINE

## 2022-01-10 RX ORDER — AZITHROMYCIN 250 MG/1
TABLET, FILM COATED ORAL
Qty: 6 TABLET | Refills: 1 | Status: SHIPPED | OUTPATIENT
Start: 2022-01-10 | End: 2022-01-15

## 2022-01-10 RX ORDER — PREDNISONE 10 MG/1
10 TABLET ORAL DAILY
Qty: 20 TABLET | Refills: 1 | Status: SHIPPED | OUTPATIENT
Start: 2022-01-10

## 2022-01-10 NOTE — PROGRESS NOTES
Chief Complaint   Patient presents with    Follow-up     2 mo check, sinus infection      Assessment/Plan:  Lubricate distal nasal septum - demonstrated technique  Diagnoses and all orders for this visit:    Acute non-recurrent maxillary sinusitis  -     predniSONE 10 mg tablet; Take 1 tablet (10 mg total) by mouth daily #3 po 3 days, #2 po 3 days then one daily  -     azithromycin (Zithromax) 250 mg tablet; Take 2 tablets (500 mg total) by mouth daily for 1 day, THEN 1 tablet (250 mg total) daily for 4 days  Subjective:      Patient ID: Augusto Pisano is a 62 y o  male  Frontal sinus pressure, Afrin HS helps  This time of year gets sinus infection  The following portions of the patient's history were reviewed and updated as appropriate: allergies, current medications, past medical history, past social history, past surgical history and problem list     Review of Systems   Constitutional: Negative  HENT: Positive for sinus pressure  Respiratory: Negative  Gastrointestinal: Negative  Psychiatric/Behavioral: Negative  Objective:      /80 (BP Location: Left arm, Patient Position: Sitting, Cuff Size: Large)   Pulse 87   Temp 98 4 °F (36 9 °C) (Temporal)   Ht 5' 7" (1 702 m)   Wt 82 6 kg (182 lb)   SpO2 99%   BMI 28 51 kg/m²     Current Outpatient Medications on File Prior to Visit   Medication Sig Dispense Refill    acyclovir (ZOVIRAX) 400 MG tablet One po BID for 5 days  60 tablet 1    fluticasone (FLONASE) 50 mcg/act nasal spray 2 sprays into each nostril daily 1 Bottle 3    methylPREDNISolone 4 MG tablet therapy pack Use as directed on package 21 each 0    predniSONE 10 mg tablet Take 1 tablet (10 mg total) by mouth daily #3 po 3 days, #2 po 3 days then one daily   20 tablet 0    promethazine-codeine (PHENERGAN WITH CODEINE) 6 25-10 mg/5 mL syrup Take 5 mL by mouth every 6 (six) hours as needed for cough (Patient not taking: Reported on 2/3/2020) 120 mL 0  tadalafil (CIALIS) 5 MG tablet Take 5 mg by mouth daily as needed for erectile dysfunction (Patient not taking: Reported on 12/21/2021 )       No current facility-administered medications on file prior to visit  No Known Allergies  Past Surgical History:   Procedure Laterality Date    KNEE ARTHROSCOPY      LEFT MENISCUC    NASAL SINUS SURGERY      [place] ARTHROSCOPY SHOULDER SURGICAL BICEPS TENODESIS Left 5/9/2017    Procedure: ARTHROSCOPIC BICEPS TENODESIS;  Surgeon: Stacey Wei MD;  [facility];  Service: Orthopedics    [place] Kassandra Betancourt Left 5/9/2017    Procedure: SHOULDER ARTHROSCOPY; ROTATOR CUFF REPAIR;  Surgeon: Stacey Wei MD;  [facility];  Service: Orthopedics          Physical Exam  Constitutional:       Appearance: He is well-developed  HENT:      Head: Normocephalic and atraumatic  Right Ear: Tympanic membrane, ear canal and external ear normal       Left Ear: Tympanic membrane, ear canal and external ear normal       Nose: Nose normal       Comments: Distal septum dry, raw  Mouth/Throat:      Mouth: Mucous membranes are moist       Pharynx: Oropharynx is clear  Eyes:      Conjunctiva/sclera: Conjunctivae normal       Pupils: Pupils are equal, round, and reactive to light  Cardiovascular:      Rate and Rhythm: Normal rate and regular rhythm  Heart sounds: Normal heart sounds  Pulmonary:      Effort: Pulmonary effort is normal       Breath sounds: Normal breath sounds  Musculoskeletal:      Cervical back: Normal range of motion and neck supple  Skin:     General: Skin is warm and dry  Neurological:      Mental Status: He is alert and oriented to person, place, and time  Deep Tendon Reflexes: Reflexes are normal and symmetric  Psychiatric:         Mood and Affect: Mood normal          Behavior: Behavior normal          Thought Content:  Thought content normal          Judgment: Judgment normal

## 2022-03-28 ENCOUNTER — OFFICE VISIT (OUTPATIENT)
Dept: FAMILY MEDICINE CLINIC | Facility: CLINIC | Age: 62
End: 2022-03-28
Payer: COMMERCIAL

## 2022-03-28 VITALS
TEMPERATURE: 99 F | BODY MASS INDEX: 28.41 KG/M2 | HEIGHT: 67 IN | WEIGHT: 181 LBS | HEART RATE: 94 BPM | SYSTOLIC BLOOD PRESSURE: 128 MMHG | DIASTOLIC BLOOD PRESSURE: 82 MMHG | OXYGEN SATURATION: 98 %

## 2022-03-28 DIAGNOSIS — Z00.00 HEALTH CARE MAINTENANCE: Primary | ICD-10-CM

## 2022-03-28 DIAGNOSIS — Z12.5 PROSTATE CANCER SCREENING: ICD-10-CM

## 2022-03-28 DIAGNOSIS — Z12.11 COLON CANCER SCREENING: ICD-10-CM

## 2022-03-28 DIAGNOSIS — E55.9 VITAMIN D DEFICIENCY: ICD-10-CM

## 2022-03-28 DIAGNOSIS — E78.2 MIXED HYPERLIPIDEMIA: ICD-10-CM

## 2022-03-28 PROCEDURE — 99396 PREV VISIT EST AGE 40-64: CPT | Performed by: FAMILY MEDICINE

## 2022-03-28 PROCEDURE — 3725F SCREEN DEPRESSION PERFORMED: CPT | Performed by: FAMILY MEDICINE

## 2022-03-28 PROCEDURE — 1036F TOBACCO NON-USER: CPT | Performed by: FAMILY MEDICINE

## 2022-03-28 PROCEDURE — 3008F BODY MASS INDEX DOCD: CPT | Performed by: FAMILY MEDICINE

## 2022-03-28 NOTE — PROGRESS NOTES
Chief Complaint   Patient presents with    Physical Exam     Requesting BW      Assessment/Plan:  Fasting labs, repeat colonoscopy  BMI Counseling: Body mass index is 28 35 kg/m²  The BMI is above normal  Nutrition recommendations include reducing portion sizes, moderation in carbohydrate intake and increasing intake of lean protein  PHQ-2/9 Depression Screening    Little interest or pleasure in doing things: 0 - not at all  Feeling down, depressed, or hopeless: 0 - not at all  PHQ-2 Score: 0  PHQ-2 Interpretation: Negative depression screen          Diagnoses and all orders for this visit:    Health care maintenance    Colon cancer screening  -     Ambulatory Referral to Gastroenterology; Future    Mixed hyperlipidemia  -     Basic metabolic panel; Future  -     CBC and Platelet; Future  -     Hepatic function panel; Future    Prostate cancer screening  -     PSA, Total Screen; Future    Vitamin D deficiency  -     Lipid panel; Future  -     Vitamin D 25 hydroxy; Future          Subjective:      Patient ID: Herminio Marroquin is a 65 y o  male  Physical   Colonoscopy 5 years ago  The following portions of the patient's history were reviewed and updated as appropriate: allergies, current medications, past family history, past medical history, past social history, past surgical history and problem list     Review of Systems   Constitutional: Negative  HENT: Negative  Eyes: Negative  Respiratory: Negative  Cardiovascular: Negative  Gastrointestinal: Negative  Genitourinary: Negative  Musculoskeletal: Negative  Skin: Negative  Neurological: Negative  Psychiatric/Behavioral: Negative            Objective:      /82 (BP Location: Left arm, Patient Position: Sitting, Cuff Size: Large)   Pulse 94   Temp 99 °F (37 2 °C) (Temporal)   Ht 5' 7" (1 702 m)   Wt 82 1 kg (181 lb)   SpO2 98%   BMI 28 35 kg/m²       Current Outpatient Medications:     acyclovir (ZOVIRAX) 400 MG tablet, One po BID for 5 days  (Patient not taking: Reported on 3/28/2022 ), Disp: 60 tablet, Rfl: 1    fluticasone (FLONASE) 50 mcg/act nasal spray, 2 sprays into each nostril daily (Patient not taking: Reported on 3/28/2022 ), Disp: 1 Bottle, Rfl: 3    methylPREDNISolone 4 MG tablet therapy pack, Use as directed on package (Patient not taking: Reported on 3/28/2022 ), Disp: 21 each, Rfl: 0    predniSONE 10 mg tablet, Take 1 tablet (10 mg total) by mouth daily #3 po 3 days, #2 po 3 days then one daily  (Patient not taking: Reported on 3/28/2022 ), Disp: 20 tablet, Rfl: 1    promethazine-codeine (PHENERGAN WITH CODEINE) 6 25-10 mg/5 mL syrup, Take 5 mL by mouth every 6 (six) hours as needed for cough (Patient not taking: Reported on 2/3/2020), Disp: 120 mL, Rfl: 0    tadalafil (CIALIS) 5 MG tablet, Take 5 mg by mouth daily as needed for erectile dysfunction (Patient not taking: Reported on 12/21/2021 ), Disp: , Rfl:   No Known Allergies  Past Surgical History:   Procedure Laterality Date    KNEE ARTHROSCOPY      LEFT MENISCUC    NASAL SINUS SURGERY      [place] ARTHROSCOPY SHOULDER SURGICAL BICEPS TENODESIS Left 5/9/2017    Procedure: ARTHROSCOPIC BICEPS TENODESIS;  Surgeon: Cheng Winter MD;  [facility];  Service: Orthopedics    [place] Radha Mercer Left 5/9/2017    Procedure: SHOULDER ARTHROSCOPY; ROTATOR CUFF REPAIR;  Surgeon: Cheng Winter MD;  [facility];  Service: Orthopedics          Physical Exam  Constitutional:       Appearance: Normal appearance  He is well-developed  HENT:      Head: Normocephalic and atraumatic  Right Ear: External ear normal       Left Ear: External ear normal       Nose: Nose normal       Mouth/Throat:      Mouth: Mucous membranes are moist       Pharynx: Oropharynx is clear  Eyes:      Conjunctiva/sclera: Conjunctivae normal       Pupils: Pupils are equal, round, and reactive to light     Cardiovascular:      Rate and Rhythm: Normal rate and regular rhythm  Heart sounds: Normal heart sounds  Pulmonary:      Effort: Pulmonary effort is normal       Breath sounds: Normal breath sounds  Abdominal:      General: Bowel sounds are normal       Palpations: Abdomen is soft  Genitourinary:     Prostate: Normal    Musculoskeletal:      Cervical back: Normal range of motion and neck supple  Skin:     General: Skin is warm and dry  Neurological:      General: No focal deficit present  Mental Status: He is alert and oriented to person, place, and time  Deep Tendon Reflexes: Reflexes are normal and symmetric  Psychiatric:         Mood and Affect: Mood normal          Behavior: Behavior normal          Thought Content:  Thought content normal          Judgment: Judgment normal

## 2022-03-29 ENCOUNTER — TELEPHONE (OUTPATIENT)
Dept: ADMINISTRATIVE | Facility: OTHER | Age: 62
End: 2022-03-29

## 2022-03-29 NOTE — TELEPHONE ENCOUNTER
Upon review of the In Basket request we were able to locate, review, and update the patient chart as requested for CRC: Colonoscopy  Any additional questions or concerns should be emailed to the Practice Liaisons via [email or web address] email, please do not reply via In Basket      Thank you  Lucie Gallegos

## 2022-03-29 NOTE — TELEPHONE ENCOUNTER
----- Message from Jerrod Davis sent at 3/29/2022 10:08 AM EDT -----  Regarding: care gap request  03/29/22 10:08 AM    Hello, our patient attached above has had CRC: Colonoscopy completed/performed  Please assist in updating the patient chart by pulling the document from the Media Tab  The date of service is 8/28/2017       Thank you,  Aline Merino MA [facility] [place] [facility]

## 2022-03-30 ENCOUNTER — APPOINTMENT (OUTPATIENT)
Dept: LAB | Facility: CLINIC | Age: 62
End: 2022-03-30
Payer: COMMERCIAL

## 2022-03-30 DIAGNOSIS — Z12.5 PROSTATE CANCER SCREENING: ICD-10-CM

## 2022-03-30 DIAGNOSIS — E78.2 MIXED HYPERLIPIDEMIA: ICD-10-CM

## 2022-03-30 DIAGNOSIS — E55.9 VITAMIN D DEFICIENCY: ICD-10-CM

## 2022-03-30 LAB
25(OH)D3 SERPL-MCNC: 19 NG/ML (ref 30–100)
ALBUMIN SERPL BCP-MCNC: 3.9 G/DL (ref 3.5–5)
ALP SERPL-CCNC: 77 U/L (ref 46–116)
ALT SERPL W P-5'-P-CCNC: 35 U/L (ref 12–78)
ANION GAP SERPL CALCULATED.3IONS-SCNC: 7 MMOL/L (ref 4–13)
AST SERPL W P-5'-P-CCNC: 25 U/L (ref 5–45)
BILIRUB DIRECT SERPL-MCNC: 0.1 MG/DL (ref 0–0.2)
BILIRUB SERPL-MCNC: 0.68 MG/DL (ref 0.2–1)
BUN SERPL-MCNC: 15 MG/DL (ref 5–25)
CALCIUM SERPL-MCNC: 9.5 MG/DL (ref 8.3–10.1)
CHLORIDE SERPL-SCNC: 103 MMOL/L (ref 100–108)
CHOLEST SERPL-MCNC: 266 MG/DL
CO2 SERPL-SCNC: 28 MMOL/L (ref 21–32)
CREAT SERPL-MCNC: 1.16 MG/DL (ref 0.6–1.3)
ERYTHROCYTE [DISTWIDTH] IN BLOOD BY AUTOMATED COUNT: 12.5 % (ref 11.6–15.1)
GFR SERPL CREATININE-BSD FRML MDRD: 67 ML/MIN/1.73SQ M
GLUCOSE P FAST SERPL-MCNC: 94 MG/DL (ref 65–99)
HCT VFR BLD AUTO: 45.4 % (ref 36.5–49.3)
HDLC SERPL-MCNC: 47 MG/DL
HGB BLD-MCNC: 14.8 G/DL (ref 12–17)
LDLC SERPL CALC-MCNC: 184 MG/DL (ref 0–100)
MCH RBC QN AUTO: 29.1 PG (ref 26.8–34.3)
MCHC RBC AUTO-ENTMCNC: 32.6 G/DL (ref 31.4–37.4)
MCV RBC AUTO: 89 FL (ref 82–98)
NONHDLC SERPL-MCNC: 219 MG/DL
PLATELET # BLD AUTO: 305 THOUSANDS/UL (ref 149–390)
PMV BLD AUTO: 9.8 FL (ref 8.9–12.7)
POTASSIUM SERPL-SCNC: 4.2 MMOL/L (ref 3.5–5.3)
PROT SERPL-MCNC: 7.7 G/DL (ref 6.4–8.2)
PSA SERPL-MCNC: 2.2 NG/ML (ref 0–4)
RBC # BLD AUTO: 5.09 MILLION/UL (ref 3.88–5.62)
SODIUM SERPL-SCNC: 138 MMOL/L (ref 136–145)
TRIGL SERPL-MCNC: 174 MG/DL
WBC # BLD AUTO: 7.47 THOUSAND/UL (ref 4.31–10.16)

## 2022-03-30 PROCEDURE — 80048 BASIC METABOLIC PNL TOTAL CA: CPT

## 2022-03-30 PROCEDURE — 80061 LIPID PANEL: CPT

## 2022-03-30 PROCEDURE — 80076 HEPATIC FUNCTION PANEL: CPT

## 2022-03-30 PROCEDURE — 82306 VITAMIN D 25 HYDROXY: CPT

## 2022-03-30 PROCEDURE — 85027 COMPLETE CBC AUTOMATED: CPT

## 2022-03-30 PROCEDURE — 36415 COLL VENOUS BLD VENIPUNCTURE: CPT

## 2022-03-30 PROCEDURE — G0103 PSA SCREENING: HCPCS

## 2023-01-20 ENCOUNTER — OFFICE VISIT (OUTPATIENT)
Dept: FAMILY MEDICINE CLINIC | Facility: CLINIC | Age: 63
End: 2023-01-20

## 2023-01-20 VITALS
OXYGEN SATURATION: 98 % | TEMPERATURE: 99.1 F | SYSTOLIC BLOOD PRESSURE: 130 MMHG | BODY MASS INDEX: 27.15 KG/M2 | DIASTOLIC BLOOD PRESSURE: 80 MMHG | HEIGHT: 67 IN | HEART RATE: 89 BPM | WEIGHT: 173 LBS

## 2023-01-20 DIAGNOSIS — B00.1 RECURRENT COLD SORES: ICD-10-CM

## 2023-01-20 DIAGNOSIS — J01.00 ACUTE NON-RECURRENT MAXILLARY SINUSITIS: Primary | ICD-10-CM

## 2023-01-20 RX ORDER — ACYCLOVIR 400 MG/1
TABLET ORAL
Qty: 60 TABLET | Refills: 1 | Status: SHIPPED | OUTPATIENT
Start: 2023-01-20 | End: 2023-10-13

## 2023-01-20 RX ORDER — AZITHROMYCIN 250 MG/1
TABLET, FILM COATED ORAL
Qty: 6 TABLET | Refills: 0 | Status: SHIPPED | OUTPATIENT
Start: 2023-01-20 | End: 2023-01-25

## 2023-01-20 RX ORDER — PREDNISONE 10 MG/1
TABLET ORAL
Qty: 20 TABLET | Refills: 1 | Status: SHIPPED | OUTPATIENT
Start: 2023-01-20

## 2023-01-20 NOTE — PROGRESS NOTES
Name: Agnes Sports      : 1960      MRN: [id number]  Encounter Provider: Sally Dominguez DO  Encounter Date: 2023   Encounter department: [address] [place] [address] for hydration  Chief Complaint   Patient presents with   • Sinus Problem   • Nasal Congestion     X 1 week   • Sore Throat   • Cough     BMI Counseling: Body mass index is 27 1 kg/m²  The BMI is above normal  Nutrition recommendations include reducing portion sizes and consuming healthier snacks  PHQ-2/9 Depression Screening    Little interest or pleasure in doing things: 0 - not at all  Feeling down, depressed, or hopeless: 0 - not at all  PHQ-2 Score: 0  PHQ-2 Interpretation: Negative depression screen           Subjective     Sinusitis, maxillary  Review of Systems   Constitutional: Negative  HENT: Positive for congestion and sinus pressure  Eyes: Negative  Respiratory: Negative  Cardiovascular: Negative  Gastrointestinal: Negative  Genitourinary: Negative  Musculoskeletal: Negative  Skin: Negative  Neurological: Negative  Psychiatric/Behavioral: Negative          Past Medical History:   Diagnosis Date   • Hyperlipidemia    • Impingement syndrome of left shoulder      Past Surgical History:   Procedure Laterality Date   • KNEE ARTHROSCOPY      LEFT MENISCUC   • NASAL SINUS SURGERY     • NE SURGICAL ARTHROSCOPY ROSE W/CORACOACRM LIGM RLS Left 2017    Procedure: SHOULDER ARTHROSCOPY; ROTATOR CUFF REPAIR;  Surgeon: Jose Sarmiento MD;  [facility];  Service: Orthopedics   • NE SURGICAL ARTHROSCOPY SHOULDER BICEPS TENODESIS Left 2017    Procedure: ARTHROSCOPIC BICEPS TENODESIS;  Surgeon: Jose Sarmiento MD;  [facility];  Service: Orthopedics     Family History   Problem Relation Age of Onset   • No Known Problems Mother    • No Known Problems Father      Social History     Socioeconomic History   • Marital status: /Civil Union     Spouse name: None   • Number of children: None   • Years of education: None   • Highest education level: None   Occupational History   • None   Tobacco Use   • Smoking status: Never   • Smokeless tobacco: Never   Vaping Use   • Vaping Use: Never used   Substance and Sexual Activity   • Alcohol use: No   • Drug use: No   • Sexual activity: None   Other Topics Concern   • None   Social History Narrative   • None     Social Determinants of Health     Financial Resource Strain: Not on file   Food Insecurity: Not on file   Transportation Needs: Not on file   Physical Activity: Not on file   Stress: Not on file   Social Connections: Not on file   Intimate Partner Violence: Not on file   Housing Stability: Not on file     Current Outpatient Medications on File Prior to Visit   Medication Sig   • fluticasone (FLONASE) 50 mcg/act nasal spray 2 sprays into each nostril daily (Patient not taking: Reported on 3/28/2022 )   • promethazine-codeine (PHENERGAN WITH CODEINE) 6 25-10 mg/5 mL syrup Take 5 mL by mouth every 6 (six) hours as needed for cough (Patient not taking: Reported on 2/3/2020)   • tadalafil (CIALIS) 5 MG tablet Take 5 mg by mouth daily as needed for erectile dysfunction (Patient not taking: Reported on 12/21/2021 )   • [DISCONTINUED] acyclovir (ZOVIRAX) 400 MG tablet One po BID for 5 days  (Patient not taking: Reported on 3/28/2022 )   • [DISCONTINUED] methylPREDNISolone 4 MG tablet therapy pack Use as directed on package (Patient not taking: Reported on 3/28/2022 )   • [DISCONTINUED] predniSONE 10 mg tablet Take 1 tablet (10 mg total) by mouth daily #3 po 3 days, #2 po 3 days then one daily  (Patient not taking: Reported on 3/28/2022 )     No Known Allergies  There is no immunization history for the selected administration types on file for this patient      Objective     /80 (BP Location: Left arm, Patient Position: Sitting, Cuff Size: Large)   Pulse 89   Temp 99 1 °F (37 3 °C) (Temporal)   Ht 5' 7" (1 702 m) Wt 78 5 kg (173 lb)   SpO2 98%   BMI 27 10 kg/m²     Physical Exam  Constitutional:       Appearance: He is well-developed  HENT:      Head: Normocephalic and atraumatic  Right Ear: Tympanic membrane, ear canal and external ear normal       Left Ear: Tympanic membrane, ear canal and external ear normal       Nose: Nose normal       Mouth/Throat:      Mouth: Mucous membranes are moist       Pharynx: Oropharynx is clear  Eyes:      Conjunctiva/sclera: Conjunctivae normal       Pupils: Pupils are equal, round, and reactive to light  Cardiovascular:      Rate and Rhythm: Normal rate and regular rhythm  Heart sounds: Normal heart sounds  Pulmonary:      Effort: Pulmonary effort is normal       Breath sounds: Normal breath sounds  Musculoskeletal:      Cervical back: Normal range of motion and neck supple  Skin:     General: Skin is warm and dry  Neurological:      General: No focal deficit present  Mental Status: He is alert and oriented to person, place, and time  Deep Tendon Reflexes: Reflexes are normal and symmetric  Psychiatric:         Mood and Affect: Mood normal          Behavior: Behavior normal          Thought Content:  Thought content normal          Judgment: Judgment normal        Aminata Poster, DO

## 2023-10-18 ENCOUNTER — OFFICE VISIT (OUTPATIENT)
Dept: FAMILY MEDICINE CLINIC | Facility: CLINIC | Age: 63
End: 2023-10-18
Payer: COMMERCIAL

## 2023-10-18 VITALS
WEIGHT: 174 LBS | HEIGHT: 67 IN | TEMPERATURE: 98.1 F | BODY MASS INDEX: 27.31 KG/M2 | HEART RATE: 81 BPM | SYSTOLIC BLOOD PRESSURE: 132 MMHG | OXYGEN SATURATION: 99 % | DIASTOLIC BLOOD PRESSURE: 84 MMHG

## 2023-10-18 DIAGNOSIS — J01.00 ACUTE NON-RECURRENT MAXILLARY SINUSITIS: ICD-10-CM

## 2023-10-18 DIAGNOSIS — J01.10 ACUTE NON-RECURRENT FRONTAL SINUSITIS: Primary | ICD-10-CM

## 2023-10-18 DIAGNOSIS — B00.1 RECURRENT COLD SORES: ICD-10-CM

## 2023-10-18 PROCEDURE — 99214 OFFICE O/P EST MOD 30 MIN: CPT | Performed by: FAMILY MEDICINE

## 2023-10-18 RX ORDER — ACYCLOVIR 400 MG/1
TABLET ORAL
Qty: 60 TABLET | Refills: 1 | Status: SHIPPED | OUTPATIENT
Start: 2023-10-18 | End: 2024-07-15

## 2023-10-18 RX ORDER — PREDNISONE 10 MG/1
TABLET ORAL
Qty: 20 TABLET | Refills: 1 | Status: SHIPPED | OUTPATIENT
Start: 2023-10-18

## 2023-10-18 RX ORDER — AZITHROMYCIN 250 MG/1
TABLET, FILM COATED ORAL
Qty: 6 TABLET | Refills: 0 | Status: SHIPPED | OUTPATIENT
Start: 2023-10-18 | End: 2023-10-23

## 2023-10-18 NOTE — PROGRESS NOTES
Name: Alcira Santiago      : 1960      MRN: [id number]  Encounter Provider: Adenike Lua DO  Encounter Date: 10/18/2023   Encounter department: [address] [place] [address] 66     Chief Complaint   Patient presents with    Sinus Problem    Headache   BMI Counseling: Body mass index is 27.25 kg/m². The BMI is above normal. Nutrition recommendations include reducing portion sizes, consuming healthier snacks, and moderation in carbohydrate intake. Depression Screening and Follow-up Plan: Patient was screened for depression during today's encounter. They screened negative with a PHQ-2 score of 0. PHQ-2/9 Depression Screening    Little interest or pleasure in doing things: 0 - not at all  Feeling down, depressed, or hopeless: 0 - not at all  PHQ-2 Score: 0  PHQ-2 Interpretation: Negative depression screen           Subjective     Sinus infection - frontal, started last Wednesday. Flonase doesn't work. Review of Systems   HENT:  Positive for congestion, sinus pressure and sinus pain. Respiratory: Negative. Cardiovascular: Negative. Gastrointestinal: Negative. Neurological:  Positive for headaches. Frontal HA.        Past Medical History:   Diagnosis Date    Hyperlipidemia     Impingement syndrome of left shoulder      Past Surgical History:   Procedure Laterality Date    KNEE ARTHROSCOPY      LEFT MENISCUC    NASAL SINUS SURGERY      [place] SURGICAL ARTHROSCOPY ROSE W/CORACOACRM LIGM RLS Left 2017    Procedure: SHOULDER ARTHROSCOPY; ROTATOR CUFF REPAIR;  Surgeon: Ricardo Rivas MD;  [facility];  Service: Orthopedics    [place] SURGICAL ARTHROSCOPY SHOULDER BICEPS TENODESIS Left 2017    Procedure: ARTHROSCOPIC BICEPS TENODESIS;  Surgeon: Ricardo Rivas MD;  [facility];  Service: Orthopedics     Family History   Problem Relation Age of Onset    No Known Problems Mother     No Known Problems Father      Social History     Socioeconomic History Marital status: /Civil Union     Spouse name: None    Number of children: None    Years of education: None    Highest education level: None   Occupational History    None   Tobacco Use    Smoking status: Never    Smokeless tobacco: Never   Vaping Use    Vaping Use: Never used   Substance and Sexual Activity    Alcohol use: No    Drug use: No    Sexual activity: None   Other Topics Concern    None   Social History Narrative    None     Social Determinants of Health     Financial Resource Strain: Not on file   Food Insecurity: Not on file   Transportation Needs: Not on file   Physical Activity: Not on file   Stress: Not on file   Social Connections: Not on file   Intimate Partner Violence: Not on file   Housing Stability: Not on file     Current Outpatient Medications on File Prior to Visit   Medication Sig    [DISCONTINUED] acyclovir (ZOVIRAX) 400 MG tablet One po BID for 5 days. fluticasone (FLONASE) 50 mcg/act nasal spray 2 sprays into each nostril daily (Patient not taking: Reported on 3/28/2022 )    promethazine-codeine (PHENERGAN WITH CODEINE) 6.25-10 mg/5 mL syrup Take 5 mL by mouth every 6 (six) hours as needed for cough (Patient not taking: Reported on 2/3/2020)    tadalafil (CIALIS) 5 MG tablet Take 5 mg by mouth daily as needed for erectile dysfunction (Patient not taking: Reported on 12/21/2021 )    [DISCONTINUED] predniSONE 10 mg tablet #3 po 3 days, #2 po 3 days then one daily. (Patient not taking: Reported on 10/18/2023)     No Known Allergies  There is no immunization history for the selected administration types on file for this patient. Objective     /84 (BP Location: Left arm, Patient Position: Sitting, Cuff Size: Standard)   Pulse 81   Temp 98.1 °F (36.7 °C) (Tympanic)   Ht 5' 7" (1.702 m)   Wt 78.9 kg (174 lb)   SpO2 99%   BMI 27.25 kg/m²     Physical Exam  Constitutional:       Appearance: He is well-developed. HENT:      Head: Normocephalic and atraumatic.       Right Ear: Tympanic membrane, ear canal and external ear normal.      Left Ear: Tympanic membrane, ear canal and external ear normal.      Nose:      Comments: Sweellin g of turbinates. Mouth/Throat:      Mouth: Mucous membranes are moist.      Pharynx: Oropharynx is clear. Eyes:      Conjunctiva/sclera: Conjunctivae normal.      Pupils: Pupils are equal, round, and reactive to light. Cardiovascular:      Rate and Rhythm: Normal rate and regular rhythm. Heart sounds: Normal heart sounds. Pulmonary:      Effort: Pulmonary effort is normal.      Breath sounds: Normal breath sounds. Musculoskeletal:      Cervical back: Normal range of motion and neck supple. Skin:     General: Skin is warm and dry. Neurological:      Mental Status: He is alert and oriented to person, place, and time. Deep Tendon Reflexes: Reflexes are normal and symmetric. Psychiatric:         Mood and Affect: Mood normal.         Behavior: Behavior normal.         Thought Content:  Thought content normal.         Judgment: Judgment normal.       Bia Camp, DO

## 2023-11-09 DIAGNOSIS — B00.1 RECURRENT COLD SORES: ICD-10-CM

## 2023-11-10 DIAGNOSIS — B00.1 RECURRENT COLD SORES: ICD-10-CM

## 2023-11-10 RX ORDER — ACYCLOVIR 400 MG/1
TABLET ORAL
Qty: 60 TABLET | Refills: 1 | Status: SHIPPED | OUTPATIENT
Start: 2023-11-10 | End: 2024-08-07

## 2023-11-10 RX ORDER — ACYCLOVIR 400 MG/1
TABLET ORAL
Qty: 180 TABLET | Refills: 1 | OUTPATIENT
Start: 2023-11-10

## 2024-01-10 ENCOUNTER — OFFICE VISIT (OUTPATIENT)
Dept: FAMILY MEDICINE CLINIC | Facility: CLINIC | Age: 64
End: 2024-01-10
Payer: COMMERCIAL

## 2024-01-10 VITALS
HEIGHT: 67 IN | RESPIRATION RATE: 18 BRPM | WEIGHT: 179.6 LBS | SYSTOLIC BLOOD PRESSURE: 126 MMHG | BODY MASS INDEX: 28.19 KG/M2 | OXYGEN SATURATION: 99 % | HEART RATE: 82 BPM | DIASTOLIC BLOOD PRESSURE: 86 MMHG | TEMPERATURE: 98.2 F

## 2024-01-10 DIAGNOSIS — E55.9 VITAMIN D DEFICIENCY: ICD-10-CM

## 2024-01-10 DIAGNOSIS — E78.00 HYPERCHOLESTEREMIA: ICD-10-CM

## 2024-01-10 DIAGNOSIS — Z00.00 HEALTH CARE MAINTENANCE: ICD-10-CM

## 2024-01-10 DIAGNOSIS — J01.10 ACUTE NON-RECURRENT FRONTAL SINUSITIS: Primary | ICD-10-CM

## 2024-01-10 DIAGNOSIS — Z12.5 PROSTATE CANCER SCREENING: ICD-10-CM

## 2024-01-10 PROCEDURE — 99213 OFFICE O/P EST LOW 20 MIN: CPT | Performed by: FAMILY MEDICINE

## 2024-01-10 RX ORDER — PREDNISONE 10 MG/1
TABLET ORAL
Qty: 20 TABLET | Refills: 1 | Status: SHIPPED | OUTPATIENT
Start: 2024-01-10

## 2024-01-10 RX ORDER — AZITHROMYCIN 250 MG/1
TABLET, FILM COATED ORAL DAILY
Qty: 6 TABLET | Refills: 0 | Status: SHIPPED | OUTPATIENT
Start: 2024-01-10 | End: 2024-01-15

## 2024-01-10 NOTE — PROGRESS NOTES
Name: Alexander Caputo      : 1960      MRN: [id number]  Encounter Provider: Don Serrano DO  Encounter Date: 1/10/2024   Encounter department: [facility]    Assessment & Plan   BMI Counseling: Body mass index is 28.13 kg/m². The BMI is above normal. Nutrition recommendations include reducing portion sizes, consuming healthier snacks, and moderation in carbohydrate intake.  Chief Complaint   Patient presents with    URI     Started 5 days ago scratchy throat, sinus congestion, on and off cough. Denies any fever or chills. Exposed to family member who are sick. No covid testing done. Tried tylenol cold, nyquill, tea, honey, afrin nasal spray      1. Acute non-recurrent frontal sinusitis  -     azithromycin (Zithromax) 250 mg tablet; Take 2 tablets (500 mg total) by mouth daily for 1 day, THEN 1 tablet (250 mg total) daily for 4 days.  -     predniSONE 10 mg tablet; #3 po 3 days, #2 po 3 days then one daily.    2. Hypercholesteremia  -     Basic metabolic panel; Future; Expected date: 2024  -     Lipid panel; Future  -     CBC and differential; Future    3. Vitamin D deficiency  -     Vitamin D 25 hydroxy; Future    4. Health care maintenance  -     HIV 1/2 AB/AG w Reflex [facility] for 2 yr old and above; Future    5. Prostate cancer screening  -     PSA, Total Screen; Future           Subjective     Frontal sinus pressure past 2 - 3 days following recent head cold.      Review of Systems   Constitutional: Negative.    HENT:  Positive for congestion and sinus pressure.         Frontal   Eyes: Negative.    Respiratory: Negative.     Cardiovascular: Negative.    Gastrointestinal: Negative.    Genitourinary: Negative.    Musculoskeletal: Negative.    Skin: Negative.    Neurological: Negative.    Psychiatric/Behavioral: Negative.         Past Medical History:   Diagnosis Date    Hyperlipidemia     Impingement syndrome of left shoulder      Past Surgical History:   Procedure Laterality Date    KNEE  ARTHROSCOPY      LEFT MENISCUC    NASAL SINUS SURGERY      [facility] SURGICAL ARTHROSCOPY ROSE W/CORACOACRM LIGM RLS Left 5/9/2017    Procedure: SHOULDER ARTHROSCOPY; ROTATOR CUFF REPAIR;  Surgeon: Sam White MD;  [facility]: [facility];  Service: Orthopedics    MS SURGICAL ARTHROSCOPY SHOULDER BICEPS TENODESIS Left 5/9/2017    Procedure: ARTHROSCOPIC BICEPS TENODESIS;  Surgeon: Sam White MD;  Location: [facility];  Service: Orthopedics     Family History   Problem Relation Age of Onset    No Known Problems Mother     No Known Problems Father      Social History     Socioeconomic History    Marital status: /Civil Union     Spouse name: None    Number of children: None    Years of education: None    Highest education level: None   Occupational History    None   Tobacco Use    Smoking status: Never    Smokeless tobacco: Never   Vaping Use    Vaping status: Never Used   Substance and Sexual Activity    Alcohol use: No    Drug use: No    Sexual activity: None   Other Topics Concern    None   Social History Narrative    None     Social Determinants of Health     Financial Resource Strain: Not on file   Food Insecurity: Not on file   Transportation Needs: Not on file   Physical Activity: Not on file   Stress: Not on file   Social Connections: Not on file   Intimate Partner Violence: Not on file   Housing Stability: Not on file     Current Outpatient Medications on File Prior to Visit   Medication Sig    acyclovir (ZOVIRAX) 400 MG tablet One po BID for 5 days. (Patient not taking: Reported on 1/10/2024)    fluticasone (FLONASE) 50 mcg/act nasal spray 2 sprays into each nostril daily (Patient not taking: Reported on 3/28/2022)    promethazine-codeine (PHENERGAN WITH CODEINE) 6.25-10 mg/5 mL syrup Take 5 mL by mouth every 6 (six) hours as needed for cough (Patient not taking: Reported on 2/3/2020)    tadalafil (CIALIS) 5 MG tablet Take 5 mg by mouth daily as needed for erectile dysfunction (Patient not taking:  "Reported on 12/21/2021)    [DISCONTINUED] predniSONE 10 mg tablet #3 po 3 days, #2 po 3 days then one daily. (Patient not taking: Reported on 1/10/2024)     No Known Allergies  There is no immunization history for the selected administration types on file for this patient.    Objective     /86 (BP Location: Left arm, Patient Position: Sitting, Cuff Size: Standard)   Pulse 82   Temp 98.2 °F (36.8 °C) (Oral)   Resp 18   Ht 5' 7\" (1.702 m)   Wt 81.5 kg (179 lb 9.6 oz)   SpO2 99%   BMI 28.13 kg/m²     Physical Exam  Constitutional:       Appearance: He is well-developed.   HENT:      Head: Normocephalic and atraumatic.      Right Ear: Tympanic membrane, ear canal and external ear normal.      Left Ear: Tympanic membrane, ear canal and external ear normal.      Nose: Nose normal.      Mouth/Throat:      Mouth: Mucous membranes are moist.      Pharynx: Oropharynx is clear.   Eyes:      Conjunctiva/sclera: Conjunctivae normal.      Pupils: Pupils are equal, round, and reactive to light.   Cardiovascular:      Rate and Rhythm: Normal rate and regular rhythm.      Heart sounds: Normal heart sounds.   Pulmonary:      Effort: Pulmonary effort is normal.      Breath sounds: Normal breath sounds.   Musculoskeletal:      Cervical back: Normal range of motion and neck supple.   Skin:     General: Skin is warm and dry.   Neurological:      Mental Status: He is alert and oriented to person, place, and time.      Deep Tendon Reflexes: Reflexes are normal and symmetric.   Psychiatric:         Mood and Affect: Mood normal.         Behavior: Behavior normal.         Thought Content: Thought content normal.         Judgment: Judgment normal.       Don Serrano, DO    "

## 2024-02-26 ENCOUNTER — APPOINTMENT (OUTPATIENT)
Dept: LAB | Facility: CLINIC | Age: 64
End: 2024-02-26
Payer: COMMERCIAL

## 2024-02-26 DIAGNOSIS — E78.00 HYPERCHOLESTEREMIA: ICD-10-CM

## 2024-02-26 DIAGNOSIS — Z00.00 HEALTH CARE MAINTENANCE: ICD-10-CM

## 2024-02-26 DIAGNOSIS — E55.9 VITAMIN D DEFICIENCY: ICD-10-CM

## 2024-02-26 DIAGNOSIS — Z12.5 PROSTATE CANCER SCREENING: ICD-10-CM

## 2024-02-26 LAB
25(OH)D3 SERPL-MCNC: 23.7 NG/ML (ref 30–100)
ANION GAP SERPL CALCULATED.3IONS-SCNC: 8 MMOL/L
BASOPHILS # BLD AUTO: 0.05 THOUSANDS/ÂΜL (ref 0–0.1)
BASOPHILS NFR BLD AUTO: 1 % (ref 0–1)
BUN SERPL-MCNC: 14 MG/DL (ref 5–25)
CALCIUM SERPL-MCNC: 10 MG/DL (ref 8.4–10.2)
CHLORIDE SERPL-SCNC: 104 MMOL/L (ref 96–108)
CHOLEST SERPL-MCNC: 250 MG/DL
CO2 SERPL-SCNC: 28 MMOL/L (ref 21–32)
CREAT SERPL-MCNC: 1.04 MG/DL (ref 0.6–1.3)
EOSINOPHIL # BLD AUTO: 0.33 THOUSAND/ÂΜL (ref 0–0.61)
EOSINOPHIL NFR BLD AUTO: 4 % (ref 0–6)
ERYTHROCYTE [DISTWIDTH] IN BLOOD BY AUTOMATED COUNT: 12.2 % (ref 11.6–15.1)
GFR SERPL CREATININE-BSD FRML MDRD: 76 ML/MIN/1.73SQ M
GLUCOSE P FAST SERPL-MCNC: 91 MG/DL (ref 65–99)
HCT VFR BLD AUTO: 46.1 % (ref 36.5–49.3)
HDLC SERPL-MCNC: 49 MG/DL
HGB BLD-MCNC: 15.3 G/DL (ref 12–17)
HIV 1+2 AB+HIV1 P24 AG SERPL QL IA: NORMAL
HIV 2 AB SERPL QL IA: NORMAL
HIV1 AB SERPL QL IA: NORMAL
HIV1 P24 AG SERPL QL IA: NORMAL
IMM GRANULOCYTES # BLD AUTO: 0.02 THOUSAND/UL (ref 0–0.2)
IMM GRANULOCYTES NFR BLD AUTO: 0 % (ref 0–2)
LDLC SERPL CALC-MCNC: 159 MG/DL (ref 0–100)
LYMPHOCYTES # BLD AUTO: 2.74 THOUSANDS/ÂΜL (ref 0.6–4.47)
LYMPHOCYTES NFR BLD AUTO: 33 % (ref 14–44)
MCH RBC QN AUTO: 30.1 PG (ref 26.8–34.3)
MCHC RBC AUTO-ENTMCNC: 33.2 G/DL (ref 31.4–37.4)
MCV RBC AUTO: 91 FL (ref 82–98)
MONOCYTES # BLD AUTO: 0.76 THOUSAND/ÂΜL (ref 0.17–1.22)
MONOCYTES NFR BLD AUTO: 9 % (ref 4–12)
NEUTROPHILS # BLD AUTO: 4.35 THOUSANDS/ÂΜL (ref 1.85–7.62)
NEUTS SEG NFR BLD AUTO: 53 % (ref 43–75)
NONHDLC SERPL-MCNC: 201 MG/DL
NRBC BLD AUTO-RTO: 0 /100 WBCS
PLATELET # BLD AUTO: 330 THOUSANDS/UL (ref 149–390)
PMV BLD AUTO: 9.6 FL (ref 8.9–12.7)
POTASSIUM SERPL-SCNC: 4 MMOL/L (ref 3.5–5.3)
PSA SERPL-MCNC: 2.03 NG/ML (ref 0–4)
RBC # BLD AUTO: 5.08 MILLION/UL (ref 3.88–5.62)
SODIUM SERPL-SCNC: 140 MMOL/L (ref 135–147)
TRIGL SERPL-MCNC: 211 MG/DL
WBC # BLD AUTO: 8.25 THOUSAND/UL (ref 4.31–10.16)

## 2024-02-26 PROCEDURE — 80048 BASIC METABOLIC PNL TOTAL CA: CPT

## 2024-02-26 PROCEDURE — 80061 LIPID PANEL: CPT

## 2024-02-26 PROCEDURE — 36415 COLL VENOUS BLD VENIPUNCTURE: CPT

## 2024-02-26 PROCEDURE — 85025 COMPLETE CBC W/AUTO DIFF WBC: CPT

## 2024-02-26 PROCEDURE — G0103 PSA SCREENING: HCPCS

## 2024-02-26 PROCEDURE — 87389 HIV-1 AG W/HIV-1&-2 AB AG IA: CPT

## 2024-02-26 PROCEDURE — 82306 VITAMIN D 25 HYDROXY: CPT

## 2024-03-05 ENCOUNTER — OFFICE VISIT (OUTPATIENT)
Dept: FAMILY MEDICINE CLINIC | Facility: CLINIC | Age: 64
End: 2024-03-05
Payer: COMMERCIAL

## 2024-03-05 VITALS
RESPIRATION RATE: 18 BRPM | DIASTOLIC BLOOD PRESSURE: 82 MMHG | SYSTOLIC BLOOD PRESSURE: 118 MMHG | BODY MASS INDEX: 28.16 KG/M2 | TEMPERATURE: 99.1 F | OXYGEN SATURATION: 100 % | HEIGHT: 67 IN | HEART RATE: 80 BPM | WEIGHT: 179.4 LBS

## 2024-03-05 DIAGNOSIS — J01.10 ACUTE NON-RECURRENT FRONTAL SINUSITIS: Primary | ICD-10-CM

## 2024-03-05 PROCEDURE — 99213 OFFICE O/P EST LOW 20 MIN: CPT | Performed by: FAMILY MEDICINE

## 2024-03-05 RX ORDER — AZITHROMYCIN 250 MG/1
TABLET, FILM COATED ORAL DAILY
Qty: 6 TABLET | Refills: 0 | Status: SHIPPED | OUTPATIENT
Start: 2024-03-05 | End: 2024-03-10

## 2024-03-05 RX ORDER — PREDNISONE 10 MG/1
TABLET ORAL
Qty: 20 TABLET | Refills: 1 | Status: SHIPPED | OUTPATIENT
Start: 2024-03-05

## 2024-03-05 NOTE — PROGRESS NOTES
Name: Alexander Caputo      : 1960      MRN: [id number]  Encounter Provider: Don Serrano DO  Encounter Date: 3/5/2024   Encounter department: [facility]    Assessment & Plan     Hands off the face.        BMI Counseling: Body mass index is 28.1 kg/m². The BMI is above normal. Nutrition recommendations include reducing portion sizes and moderation in carbohydrate intake.  Chief Complaint   Patient presents with    URI     Started a week ago with sinus pressure and congestion, cough, has been taking tylenol cold and flu.      1. Acute non-recurrent frontal sinusitis  -     azithromycin (Zithromax) 250 mg tablet; Take 2 tablets (500 mg total) by mouth daily for 1 day, THEN 1 tablet (250 mg total) daily for 4 days.  -     predniSONE 10 mg tablet; #3 po 3 days, #2 po 3 days then one daily.           Subjective     Sinus pressure - frontal past 7 days.  Started with head cold one week ago.  Retired .      Review of Systems   Constitutional: Negative.    HENT:  Positive for congestion and sinus pressure.    Eyes: Negative.    Respiratory:  Positive for cough.    Cardiovascular: Negative.    Gastrointestinal: Negative.    Genitourinary: Negative.    Musculoskeletal: Negative.    Skin: Negative.    Neurological: Negative.    Psychiatric/Behavioral: Negative.         Past Medical History:   Diagnosis Date    Hyperlipidemia     Impingement syndrome of left shoulder      Past Surgical History:   Procedure Laterality Date    KNEE ARTHROSCOPY      LEFT MENISCUC    NASAL SINUS SURGERY      [place] SURGICAL ARTHROSCOPY ROSE W/CORACOACRM LIGM RLS Left 2017    Procedure: SHOULDER ARTHROSCOPY; ROTATOR CUFF REPAIR;  Surgeon: Sam White MD;  [facility];  Service: Orthopedics    OK SURGICAL ARTHROSCOPY SHOULDER BICEPS TENODESIS Left 2017    Procedure: ARTHROSCOPIC BICEPS TENODESIS;  Surgeon: Sam White MD;  Location: [facility];  Service: Orthopedics     Family History   Problem  "Relation Age of Onset    No Known Problems Mother     No Known Problems Father      Social History     Socioeconomic History    Marital status: /Civil Union     Spouse name: None    Number of children: None    Years of education: None    Highest education level: None   Occupational History    None   Tobacco Use    Smoking status: Never    Smokeless tobacco: Never   Vaping Use    Vaping status: Never Used   Substance and Sexual Activity    Alcohol use: No    Drug use: No    Sexual activity: None   Other Topics Concern    None   Social History Narrative    None     Social Determinants of Health     Financial Resource Strain: Not on file   Food Insecurity: Not on file   Transportation Needs: Not on file   Physical Activity: Not on file   Stress: Not on file   Social Connections: Not on file   Intimate Partner Violence: Not on file   Housing Stability: Not on file     Current Outpatient Medications on File Prior to Visit   Medication Sig    acyclovir (ZOVIRAX) 400 MG tablet One po BID for 5 days. (Patient not taking: Reported on 1/10/2024)    fluticasone (FLONASE) 50 mcg/act nasal spray 2 sprays into each nostril daily (Patient not taking: Reported on 3/28/2022)    promethazine-codeine (PHENERGAN WITH CODEINE) 6.25-10 mg/5 mL syrup Take 5 mL by mouth every 6 (six) hours as needed for cough (Patient not taking: Reported on 2/3/2020)    tadalafil (CIALIS) 5 MG tablet Take 5 mg by mouth daily as needed for erectile dysfunction (Patient not taking: Reported on 12/21/2021)    [DISCONTINUED] predniSONE 10 mg tablet #3 po 3 days, #2 po 3 days then one daily. (Patient not taking: Reported on 3/5/2024)     No Known Allergies  There is no immunization history for the selected administration types on file for this patient.    Objective     /82 (BP Location: Left arm, Patient Position: Sitting, Cuff Size: Standard)   Pulse 80   Temp 99.1 °F (37.3 °C) (Oral)   Resp 18   Ht 5' 7\" (1.702 m)   Wt 81.4 kg (179 lb 6.4 " oz)   SpO2 100%   BMI 28.10 kg/m²     Physical Exam  Constitutional:       Appearance: He is well-developed.   HENT:      Head: Normocephalic and atraumatic.      Right Ear: Tympanic membrane, ear canal and external ear normal.      Left Ear: Tympanic membrane, ear canal and external ear normal.      Nose: Nose normal.      Mouth/Throat:      Mouth: Mucous membranes are moist.      Pharynx: Oropharynx is clear.   Eyes:      Conjunctiva/sclera: Conjunctivae normal.      Pupils: Pupils are equal, round, and reactive to light.   Cardiovascular:      Rate and Rhythm: Normal rate and regular rhythm.      Heart sounds: Normal heart sounds.   Pulmonary:      Effort: Pulmonary effort is normal.      Breath sounds: Normal breath sounds.   Musculoskeletal:      Cervical back: Normal range of motion and neck supple.   Skin:     General: Skin is warm and dry.   Neurological:      Mental Status: He is alert and oriented to person, place, and time.      Deep Tendon Reflexes: Reflexes are normal and symmetric.   Psychiatric:         Mood and Affect: Mood normal.         Behavior: Behavior normal.         Thought Content: Thought content normal.         Judgment: Judgment normal.       Don Serrano,

## 2024-12-04 DIAGNOSIS — B00.1 RECURRENT COLD SORES: ICD-10-CM

## 2024-12-05 DIAGNOSIS — B00.1 RECURRENT COLD SORES: ICD-10-CM

## 2024-12-05 RX ORDER — ACYCLOVIR 400 MG/1
TABLET ORAL
Qty: 60 TABLET | Refills: 1 | Status: SHIPPED | OUTPATIENT
Start: 2024-12-05 | End: 2024-12-06 | Stop reason: SDUPTHER

## 2024-12-06 DIAGNOSIS — B00.1 RECURRENT COLD SORES: ICD-10-CM

## 2024-12-06 RX ORDER — ACYCLOVIR 400 MG/1
TABLET ORAL
Qty: 60 TABLET | Refills: 1 | Status: SHIPPED | OUTPATIENT
Start: 2024-12-06 | End: 2025-02-06

## 2024-12-06 RX ORDER — ACYCLOVIR 400 MG/1
TABLET ORAL
Qty: 180 TABLET | Refills: 0 | OUTPATIENT
Start: 2024-12-06

## 2025-08-22 ENCOUNTER — OFFICE VISIT (OUTPATIENT)
Dept: FAMILY MEDICINE CLINIC | Facility: CLINIC | Age: 65
End: 2025-08-22
Payer: COMMERCIAL

## 2025-08-22 VITALS
TEMPERATURE: 98.4 F | WEIGHT: 178 LBS | BODY MASS INDEX: 27.94 KG/M2 | HEIGHT: 67 IN | DIASTOLIC BLOOD PRESSURE: 75 MMHG | OXYGEN SATURATION: 99 % | SYSTOLIC BLOOD PRESSURE: 121 MMHG | HEART RATE: 66 BPM

## 2025-08-22 DIAGNOSIS — Z00.00 ANNUAL PHYSICAL EXAM: Primary | ICD-10-CM

## 2025-08-22 DIAGNOSIS — Z12.5 SCREENING FOR PROSTATE CANCER: ICD-10-CM

## 2025-08-22 DIAGNOSIS — E78.00 HYPERCHOLESTEREMIA: ICD-10-CM

## 2025-08-22 PROCEDURE — 99396 PREV VISIT EST AGE 40-64: CPT | Performed by: FAMILY MEDICINE

## (undated) DEVICE — TUBING ARTHROSCOPIC WAVE  MAIN PUMP

## (undated) DEVICE — SPONGE PVP SCRUB WING STERILE

## (undated) DEVICE — PACK SHOULDER ARTHROSCOPY PBDS

## (undated) DEVICE — BLADE SHAVER DISSECTOR 3.5MM 13CM COOLCUT

## (undated) DEVICE — ADHESIVE SKN CLSR HISTOACRYL FLEX 0.5ML LF

## (undated) DEVICE — CHLORAPREP HI-LITE 26ML ORANGE

## (undated) DEVICE — DISPOSABLE EQUIPMENT COVER: Brand: SMALL TOWEL DRAPE

## (undated) DEVICE — INTENDED FOR TISSUE SEPARATION, AND OTHER PROCEDURES THAT REQUIRE A SHARP SURGICAL BLADE TO PUNCTURE OR CUT.: Brand: BARD-PARKER SAFETY BLADES SIZE 11, STERILE

## (undated) DEVICE — THREADED CLEAR CANNULA WITH OBTURATOR 7MM X 75MM

## (undated) DEVICE — THREADED CLEAR CANNULA WITH OBTURATOR 5.5MM X 75MM

## (undated) DEVICE — SUT MONOCRYL 4-0 PS-2 18 IN Y496G

## (undated) DEVICE — THREADED CLEAR CANNULA WITH OBTURATOR 8.5MM X 75MM

## (undated) DEVICE — 3M™ IOBAN™ 2 ANTIMICROBIAL INCISE DRAPE 6650EZ: Brand: IOBAN™ 2

## (undated) DEVICE — GLOVE SRG BIOGEL 7.5

## (undated) DEVICE — GLOVE INDICATOR PI UNDERGLOVE SZ 7.5 BLUE

## (undated) DEVICE — SHOULDER SUSPENSION KIT 6 PER BOX

## (undated) DEVICE — TUBING SUCTION 5MM X 12 FT

## (undated) DEVICE — SOFT SILICONE HYDROCELLULAR FOAM DRESSING WITH LOCK AWAY LAYER: Brand: ALLEVYN LIFE M 12.9X12.9 CTN10

## (undated) DEVICE — VAPR COOLPULSE 90 ELECTRODE 90 DEGREES SUCTION WITH INTEGRATED HANDPIECE: Brand: VAPR COOLPULSE